# Patient Record
Sex: MALE | Race: WHITE | HISPANIC OR LATINO | Employment: FULL TIME | URBAN - METROPOLITAN AREA
[De-identification: names, ages, dates, MRNs, and addresses within clinical notes are randomized per-mention and may not be internally consistent; named-entity substitution may affect disease eponyms.]

---

## 2017-05-15 ENCOUNTER — TRANSCRIBE ORDERS (OUTPATIENT)
Dept: ADMINISTRATIVE | Facility: HOSPITAL | Age: 52
End: 2017-05-15

## 2017-05-15 DIAGNOSIS — E05.20 TOXIC MULTINODULAR GOITER: Primary | ICD-10-CM

## 2018-05-24 ENCOUNTER — HOSPITAL ENCOUNTER (EMERGENCY)
Facility: HOSPITAL | Age: 53
Discharge: HOME | End: 2018-05-24
Admitting: EMERGENCY MEDICINE
Payer: COMMERCIAL

## 2018-05-24 VITALS
SYSTOLIC BLOOD PRESSURE: 160 MMHG | RESPIRATION RATE: 15 BRPM | WEIGHT: 150 LBS | OXYGEN SATURATION: 96 % | DIASTOLIC BLOOD PRESSURE: 96 MMHG | HEART RATE: 56 BPM | TEMPERATURE: 98 F

## 2018-05-24 DIAGNOSIS — K64.9 HEMORRHOID: ICD-10-CM

## 2018-05-24 DIAGNOSIS — K62.5 RECTAL BLEEDING: Primary | ICD-10-CM

## 2018-05-24 LAB
ALBUMIN SERPL BCP-MCNC: 3.9 G/DL (ref 3.5–5)
ALP SERPL-CCNC: 85 U/L (ref 46–116)
ALT SERPL W P-5'-P-CCNC: 18 U/L (ref 12–78)
ANION GAP SERPL CALCULATED.3IONS-SCNC: 7 MMOL/L (ref 4–13)
AST SERPL W P-5'-P-CCNC: 24 U/L (ref 5–45)
BASOPHILS # BLD AUTO: 0.03 THOUSANDS/ΜL (ref 0–0.1)
BASOPHILS NFR BLD AUTO: 1 % (ref 0–1)
BILIRUB SERPL-MCNC: 0.68 MG/DL (ref 0.2–1)
BUN SERPL-MCNC: 8 MG/DL (ref 5–25)
CALCIUM SERPL-MCNC: 8.7 MG/DL (ref 8.3–10.1)
CHLORIDE SERPL-SCNC: 101 MMOL/L (ref 100–108)
CO2 SERPL-SCNC: 31 MMOL/L (ref 21–32)
CREAT SERPL-MCNC: 0.72 MG/DL (ref 0.6–1.3)
EOSINOPHIL # BLD AUTO: 0.27 THOUSAND/ΜL (ref 0–0.61)
EOSINOPHIL NFR BLD AUTO: 5 % (ref 0–6)
ERYTHROCYTE [DISTWIDTH] IN BLOOD BY AUTOMATED COUNT: 14.5 % (ref 11.6–15.1)
GFR SERPL CREATININE-BSD FRML MDRD: 108 ML/MIN/1.73SQ M
GLUCOSE SERPL-MCNC: 86 MG/DL (ref 65–140)
HCT VFR BLD AUTO: 44.3 % (ref 36.5–49.3)
HGB BLD-MCNC: 15 G/DL (ref 12–17)
LYMPHOCYTES # BLD AUTO: 1.99 THOUSANDS/ΜL (ref 0.6–4.47)
LYMPHOCYTES NFR BLD AUTO: 39 % (ref 14–44)
MCH RBC QN AUTO: 29.7 PG (ref 26.8–34.3)
MCHC RBC AUTO-ENTMCNC: 33.9 G/DL (ref 31.4–37.4)
MCV RBC AUTO: 88 FL (ref 82–98)
MONOCYTES # BLD AUTO: 0.33 THOUSAND/ΜL (ref 0.17–1.22)
MONOCYTES NFR BLD AUTO: 6 % (ref 4–12)
NEUTROPHILS # BLD AUTO: 2.53 THOUSANDS/ΜL (ref 1.85–7.62)
NEUTS SEG NFR BLD AUTO: 49 % (ref 43–75)
NRBC BLD AUTO-RTO: 0 /100 WBCS
PLATELET # BLD AUTO: 187 THOUSANDS/UL (ref 149–390)
PMV BLD AUTO: 9.9 FL (ref 8.9–12.7)
POTASSIUM SERPL-SCNC: 4.3 MMOL/L (ref 3.5–5.3)
PROT SERPL-MCNC: 7.6 G/DL (ref 6.4–8.2)
RBC # BLD AUTO: 5.05 MILLION/UL (ref 3.88–5.62)
SODIUM SERPL-SCNC: 139 MMOL/L (ref 136–145)
WBC # BLD AUTO: 5.15 THOUSAND/UL (ref 4.31–10.16)

## 2018-05-24 PROCEDURE — 36415 COLL VENOUS BLD VENIPUNCTURE: CPT | Performed by: PHYSICIAN ASSISTANT

## 2018-05-24 PROCEDURE — 99285 EMERGENCY DEPT VISIT HI MDM: CPT

## 2018-05-24 PROCEDURE — 80053 COMPREHEN METABOLIC PANEL: CPT | Performed by: PHYSICIAN ASSISTANT

## 2018-05-24 PROCEDURE — 85025 COMPLETE CBC W/AUTO DIFF WBC: CPT | Performed by: PHYSICIAN ASSISTANT

## 2018-05-24 NOTE — DISCHARGE INSTRUCTIONS
- Follow up with your family doctor for colonoscopy which is recommended at the age of 49  - Use sitz baths, Preparation H, tucks pads  - Increase fiber in your diet  Rectal Bleeding   WHAT YOU NEED TO KNOW:   Rectal bleeding can be caused by constipation, hemorrhoids, or anal fissures  It may also be caused by polyps, tumors, or medical conditions, such as colitis or diverticulitis  DISCHARGE INSTRUCTIONS:   Medicines:   · Pain medicine: You may be given medicine to take away or decrease pain  Do not wait until the pain is severe before you take your medicine  · Iron supplement:  Iron helps your body make more red blood cells  · Steroids: This medicine decreases inflammation in your rectum  It may be applied as a cream, ointment, or lotion  · Take your medicine as directed  Contact your healthcare provider if you think your medicine is not helping or if you have side effects  Tell him of her if you are allergic to any medicine  Keep a list of the medicines, vitamins, and herbs you take  Include the amounts, and when and why you take them  Bring the list or the pill bottles to follow-up visits  Carry your medicine list with you in case of an emergency  Follow up with your healthcare provider as directed:  Write down your questions so you remember to ask them during your visits  Drink liquids as directed:  Ask your healthcare provider how much liquid to drink each day and which liquids are best for you  This will help prevent dehydration and constipation  Contact your healthcare provider if:   · You have a fever  · Your rectal bleeding stopped for a time, but has started again  · You have nausea  · You have cold, sweaty, pale skin  · You have changes in your bowel movements, such as diarrhea  · You have questions or concerns about your condition or care    Return to the emergency department if:   · You are breathing faster than usual     · You are dizzy, lightheaded, or feel faint     · You are confused or cannot think clearly  · You urinate less than usual or not at all  · Your rectal bleeding is constant or heavy  · You have severe abdominal pain or cramping  © 2017 [address] [employer] Information is for End User's use only and may not be sold, redistributed or otherwise used for commercial purposes  All illustrations and images included in CareNotes® are the copyrighted property of A D A M , Inc  or Oswaldo Lozano  The above information is an  only  It is not intended as medical advice for individual conditions or treatments  Talk to your doctor, nurse or pharmacist before following any medical regimen to see if it is safe and effective for you

## 2018-05-24 NOTE — ED PROVIDER NOTES
History  Chief Complaint   Patient presents with    Rectal Bleeding     Patient reports hx of hemmorhoids  c/o increased amount of rectal bleeding from hemmorhoids  Reports bright red blood in toilet and stool after bowel movements  46 yo male with a history of htn, presents for rectal bleeding x 3 days  Reports hx of hemorrhoids and noted bright red blood in the toilet when he wiped  States he noted a lot in the toilet today  Denies fever, chills, dizziness, light-headedness, n/v, abdominal pain, rectal pain  Prior to Admission Medications   Prescriptions Last Dose Informant Patient Reported? Taking? methimazole (TAPAZOLE) 10 mg tablet   No Yes   Sig: Take 1 tablet (10 mg total) by mouth daily  propranolol (INDERAL) 20 mg tablet   No Yes   Sig: Take 1 tablet (20 mg total) by mouth 3 (three) times a day  Facility-Administered Medications: None       Past Medical History:   Diagnosis Date    Disease of thyroid gland     hyperthyroid    Hypertension        Past Surgical History:   Procedure Laterality Date    HERNIA REPAIR         History reviewed  No pertinent family history  I have reviewed and agree with the history as documented  Social History   Substance Use Topics    Smoking status: Never Smoker    Smokeless tobacco: Never Used    Alcohol use No        Review of Systems   Constitutional: Negative for chills and fever  Gastrointestinal: Positive for anal bleeding  Negative for abdominal pain, constipation, diarrhea, nausea, rectal pain and vomiting  Genitourinary: Negative for dysuria, flank pain, frequency, hematuria and urgency  Skin: Negative  Physical Exam  Physical Exam   Constitutional: He is oriented to person, place, and time  He appears well-developed and well-nourished  He is cooperative  No distress  HENT:   Head: Normocephalic and atraumatic  Neck: Normal range of motion  Neck supple  Cardiovascular: Normal rate and normal heart sounds      No murmur heard  Pulmonary/Chest: Effort normal and breath sounds normal    Abdominal: Soft  Bowel sounds are normal  There is no tenderness  Genitourinary: Rectal exam shows external hemorrhoid  Rectal exam shows no fissure, no tenderness and anal tone normal    Genitourinary Comments: Two external hemorrhoids noted  Non-thrombosed  No fresh blood noted  Hemoccult positive  Musculoskeletal: Normal range of motion  Neurological: He is alert and oriented to person, place, and time  Skin: Skin is warm  No rash noted  He is not diaphoretic  No erythema  Psychiatric: He has a normal mood and affect         Vital Signs  ED Triage Vitals [05/24/18 1200]   Temperature Pulse Respirations Blood Pressure SpO2   97 9 °F (36 6 °C) 63 18 (!) 171/99 95 %      Temp Source Heart Rate Source Patient Position - Orthostatic VS BP Location FiO2 (%)   Oral Monitor Sitting Right arm --      Pain Score       No Pain           Vitals:    05/24/18 1200 05/24/18 1305 05/24/18 1425   BP: (!) 171/99 165/97 160/96   Pulse: 63 65 56   Patient Position - Orthostatic VS: Sitting Lying Sitting       Visual Acuity      ED Medications  Medications - No data to display    Diagnostic Studies  Results Reviewed     Procedure Component Value Units Date/Time    Comprehensive metabolic panel [[id number]] Collected:  05/24/18 1334    Lab Status:  Final result Specimen:  Blood from Arm, Left Updated:  05/24/18 1355     Sodium 139 mmol/L      Potassium 4 3 mmol/L      Chloride 101 mmol/L      CO2 31 mmol/L      Anion Gap 7 mmol/L      BUN 8 mg/dL      Creatinine 0 72 mg/dL      Glucose 86 mg/dL      Calcium 8 7 mg/dL      AST 24 U/L      ALT 18 U/L      Alkaline Phosphatase 85 U/L      Total Protein 7 6 g/dL      Albumin 3 9 g/dL      Total Bilirubin 0 68 mg/dL      eGFR 108 ml/min/1 73sq m     Narrative:         National Kidney Disease Education Program recommendations are as follows:  GFR calculation is accurate only with a steady state creatinine  Chronic Kidney disease less than 60 ml/min/1 73 sq  meters  Kidney failure less than 15 ml/min/1 73 sq  meters  CBC and differential [[id number]] Collected:  05/24/18 1334    Lab Status:  Final result Specimen:  Blood from Arm, Left Updated:  05/24/18 1340     WBC 5 15 Thousand/uL      RBC 5 05 Million/uL      Hemoglobin 15 0 g/dL      Hematocrit 44 3 %      MCV 88 fL      MCH 29 7 pg      MCHC 33 9 g/dL      RDW 14 5 %      MPV 9 9 fL      Platelets 319 Thousands/uL      nRBC 0 /100 WBCs      Neutrophils Relative 49 %      Lymphocytes Relative 39 %      Monocytes Relative 6 %      Eosinophils Relative 5 %      Basophils Relative 1 %      Neutrophils Absolute 2 53 Thousands/µL      Lymphocytes Absolute 1 99 Thousands/µL      Monocytes Absolute 0 33 Thousand/µL      Eosinophils Absolute 0 27 Thousand/µL      Basophils Absolute 0 03 Thousands/µL                  No orders to display              Procedures  Procedures       Phone Contacts  ED Phone Contact    ED Course                               MDM  Number of Diagnoses or Management Options  Hemorrhoid:   Rectal bleeding:   Diagnosis management comments: Well appearing 48 yo male presents for evaluation of rectal bleeding x 3 days  In no acute distress  Advised to f/u with GI if symptoms persist  Advised supportive care for hemorrhoids  CritCare Time    Disposition  Final diagnoses:   Rectal bleeding   Hemorrhoid     Time reflects when diagnosis was documented in both MDM as applicable and the Disposition within this note     Time User Action Codes Description Comment    5/24/2018  2:12 PM Drew Vuong [K62 5] Rectal bleeding     5/24/2018  2:12 PM Drew Vuong [K64 9] Hemorrhoid       ED Disposition     ED Disposition Condition Comment    Discharge  [facility] discharge to home/self care      Condition at discharge: Good        Follow-up Information     Follow up With Specialties Details Why [address] [facility] Schedule an appointment as soon as possible for a visit Follow up if symptoms persist   [facility] [id number] [phone]    Yuliana Luke MD Family Medicine Schedule an appointment as soon as possible for a visit  [address] [facility] [address]  [address] [facility] [address]  [phone]            Discharge Medication List as of 5/24/2018  2:32 PM      CONTINUE these medications which have NOT CHANGED    Details   methimazole (TAPAZOLE) 10 mg tablet Take 1 tablet (10 mg total) by mouth daily  , Starting 4/24/2016, Until Discontinued, Print      propranolol (INDERAL) 20 mg tablet Take 1 tablet (20 mg total) by mouth 3 (three) times a day , Starting 4/24/2016, Until Discontinued, Print           No discharge procedures on file      ED Provider  Electronically Signed by           Kyler Munoz PA-C  05/28/18 7829

## 2019-09-22 ENCOUNTER — HOSPITAL ENCOUNTER (EMERGENCY)
Facility: HOSPITAL | Age: 54
Discharge: HOME | End: 2019-09-22
Attending: EMERGENCY MEDICINE | Admitting: EMERGENCY MEDICINE
Payer: COMMERCIAL

## 2019-09-22 ENCOUNTER — APPOINTMENT (EMERGENCY)
Dept: RADIOLOGY | Facility: HOSPITAL | Age: 54
End: 2019-09-22
Payer: COMMERCIAL

## 2019-09-22 VITALS
OXYGEN SATURATION: 97 % | WEIGHT: 155.42 LBS | HEART RATE: 70 BPM | TEMPERATURE: 97.9 F | DIASTOLIC BLOOD PRESSURE: 80 MMHG | SYSTOLIC BLOOD PRESSURE: 149 MMHG | RESPIRATION RATE: 16 BRPM

## 2019-09-22 DIAGNOSIS — R55 SYNCOPE: Primary | ICD-10-CM

## 2019-09-22 DIAGNOSIS — R07.9 CHEST PAIN: ICD-10-CM

## 2019-09-22 LAB
ANION GAP SERPL CALCULATED.3IONS-SCNC: 8 MMOL/L (ref 4–13)
BASOPHILS # BLD AUTO: 0.02 THOUSANDS/ΜL (ref 0–0.1)
BASOPHILS NFR BLD AUTO: 0 % (ref 0–1)
BUN SERPL-MCNC: 13 MG/DL (ref 5–25)
CALCIUM SERPL-MCNC: 8.7 MG/DL (ref 8.3–10.1)
CHLORIDE SERPL-SCNC: 105 MMOL/L (ref 100–108)
CO2 SERPL-SCNC: 28 MMOL/L (ref 21–32)
CREAT SERPL-MCNC: 0.87 MG/DL (ref 0.6–1.3)
EOSINOPHIL # BLD AUTO: 0.18 THOUSAND/ΜL (ref 0–0.61)
EOSINOPHIL NFR BLD AUTO: 4 % (ref 0–6)
ERYTHROCYTE [DISTWIDTH] IN BLOOD BY AUTOMATED COUNT: 13.3 % (ref 11.6–15.1)
GFR SERPL CREATININE-BSD FRML MDRD: 99 ML/MIN/1.73SQ M
GLUCOSE SERPL-MCNC: 196 MG/DL (ref 65–140)
HCT VFR BLD AUTO: 47.2 % (ref 36.5–49.3)
HGB BLD-MCNC: 15.2 G/DL (ref 12–17)
IMM GRANULOCYTES # BLD AUTO: 0.01 THOUSAND/UL (ref 0–0.2)
IMM GRANULOCYTES NFR BLD AUTO: 0 % (ref 0–2)
LYMPHOCYTES # BLD AUTO: 1.51 THOUSANDS/ΜL (ref 0.6–4.47)
LYMPHOCYTES NFR BLD AUTO: 31 % (ref 14–44)
MCH RBC QN AUTO: 28.3 PG (ref 26.8–34.3)
MCHC RBC AUTO-ENTMCNC: 32.2 G/DL (ref 31.4–37.4)
MCV RBC AUTO: 88 FL (ref 82–98)
MONOCYTES # BLD AUTO: 0.51 THOUSAND/ΜL (ref 0.17–1.22)
MONOCYTES NFR BLD AUTO: 10 % (ref 4–12)
NEUTROPHILS # BLD AUTO: 2.71 THOUSANDS/ΜL (ref 1.85–7.62)
NEUTS SEG NFR BLD AUTO: 55 % (ref 43–75)
NRBC BLD AUTO-RTO: 0 /100 WBCS
PLATELET # BLD AUTO: 199 THOUSANDS/UL (ref 149–390)
PMV BLD AUTO: 9.6 FL (ref 8.9–12.7)
POTASSIUM SERPL-SCNC: 3.8 MMOL/L (ref 3.5–5.3)
RBC # BLD AUTO: 5.37 MILLION/UL (ref 3.88–5.62)
SODIUM SERPL-SCNC: 141 MMOL/L (ref 136–145)
TROPONIN I SERPL-MCNC: <0.02 NG/ML
WBC # BLD AUTO: 4.94 THOUSAND/UL (ref 4.31–10.16)

## 2019-09-22 PROCEDURE — 80048 BASIC METABOLIC PNL TOTAL CA: CPT | Performed by: EMERGENCY MEDICINE

## 2019-09-22 PROCEDURE — 93005 ELECTROCARDIOGRAM TRACING: CPT

## 2019-09-22 PROCEDURE — 84484 ASSAY OF TROPONIN QUANT: CPT | Performed by: EMERGENCY MEDICINE

## 2019-09-22 PROCEDURE — 99284 EMERGENCY DEPT VISIT MOD MDM: CPT | Performed by: EMERGENCY MEDICINE

## 2019-09-22 PROCEDURE — 99284 EMERGENCY DEPT VISIT MOD MDM: CPT

## 2019-09-22 PROCEDURE — 71046 X-RAY EXAM CHEST 2 VIEWS: CPT

## 2019-09-22 PROCEDURE — 85025 COMPLETE CBC W/AUTO DIFF WBC: CPT | Performed by: EMERGENCY MEDICINE

## 2019-09-22 PROCEDURE — 36415 COLL VENOUS BLD VENIPUNCTURE: CPT | Performed by: EMERGENCY MEDICINE

## 2019-09-22 NOTE — ED PROVIDER NOTES
History  Chief Complaint   Patient presents with    Syncope     last night patient had episode of chest pain and dizziness  patient states passed out  patient has large abrasion on right side of face from impact  patient with currently no complaints     47 y o  M p/w syncope x yesterday  Pt was at home and felt lightheaded  States he had "just a little" pain in his left chest prior to passing out  Felt fine afterwards  States he feels fine currently  I asked pt why he didn't come in yesterday and he states "I don't know, I wasn't scared  I just want to know what happened yesterday, so that's why I came in "  Pt denies HA, changes in vision, SOB, cough, abd pain, N/V/D, focal deficits, recent travel  History provided by:  Patient  Syncope   Episode history:  Single  Most recent episode:  Yesterday  Chronicity:  New  Relieved by:  None tried  Worsened by:  Nothing  Ineffective treatments:  None tried  Associated symptoms: chest pain    Associated symptoms: no diaphoresis, no dizziness, no fever, no headaches, no nausea, no palpitations, no shortness of breath, no visual change, no vomiting and no weakness        Prior to Admission Medications   Prescriptions Last Dose Informant Patient Reported? Taking? methimazole (TAPAZOLE) 10 mg tablet   No Yes   Sig: Take 1 tablet (10 mg total) by mouth daily  propranolol (INDERAL) 20 mg tablet   No Yes   Sig: Take 1 tablet (20 mg total) by mouth 3 (three) times a day  Facility-Administered Medications: None       Past Medical History:   Diagnosis Date    Disease of thyroid gland     hyperthyroid    Environmental allergies     Hypertension        Past Surgical History:   Procedure Laterality Date    HERNIA REPAIR         Family History   Problem Relation Age of Onset    Parkinsonism Father      I have reviewed and agree with the history as documented      Social History     Tobacco Use    Smoking status: Never Smoker    Smokeless tobacco: Never Used Substance Use Topics    Alcohol use: No    Drug use: No        Review of Systems   Constitutional: Negative for chills, diaphoresis and fever  Respiratory: Negative for cough, chest tightness and shortness of breath  Cardiovascular: Positive for chest pain and syncope  Negative for palpitations and leg swelling  Gastrointestinal: Negative for abdominal pain, nausea and vomiting  Musculoskeletal: Negative for back pain and neck pain  Skin: Negative for pallor  Neurological: Positive for syncope  Negative for dizziness, facial asymmetry, speech difficulty, weakness, light-headedness, numbness and headaches  All other systems reviewed and are negative  Physical Exam  Physical Exam   Constitutional: He is oriented to person, place, and time  He appears well-developed and well-nourished  Non-toxic appearance  He does not have a sickly appearance  He does not appear ill  No distress  HENT:   Head: Normocephalic and atraumatic  Mouth/Throat: Oropharynx is clear and moist    Eyes: Pupils are equal, round, and reactive to light  EOM are normal    Neck: Normal range of motion  Neck supple  No JVD present  Cardiovascular: Normal rate, regular rhythm, S1 normal, S2 normal, normal heart sounds, intact distal pulses and normal pulses  Exam reveals no gallop and no friction rub  No murmur heard  Pulmonary/Chest: Effort normal and breath sounds normal  No respiratory distress  He has no wheezes  He has no rales  He exhibits no tenderness  Abdominal: Soft  Bowel sounds are normal  He exhibits no distension  There is no tenderness  There is no rebound and no guarding  Musculoskeletal:        Cervical back: Normal    No LE edema/tenderness   Neurological: He is alert and oriented to person, place, and time  He has normal strength  No cranial nerve deficit or sensory deficit  Skin: Skin is warm and dry  No rash noted  He is not diaphoretic  No pallor     Nursing note and vitals reviewed        Vital Signs  ED Triage Vitals [09/22/19 1542]   Temperature Pulse Respirations Blood Pressure SpO2   97 9 °F (36 6 °C) 70 16 149/80 97 %      Temp Source Heart Rate Source Patient Position - Orthostatic VS BP Location FiO2 (%)   Oral Monitor Lying Right arm --      Pain Score       No Pain           Vitals:    09/22/19 1542   BP: 149/80   Pulse: 70   Patient Position - Orthostatic VS: Lying         Visual Acuity  Visual Acuity      Most Recent Value   L Pupil Size (mm)  3   R Pupil Size (mm)  3          ED Medications  Medications - No data to display    Diagnostic Studies  Results Reviewed     Procedure Component Value Units Date/Time    Troponin I [[id number]]  (Normal) Collected:  09/22/19 1617    Lab Status:  Final result Specimen:  Blood from Arm, Right Updated:  09/22/19 1656     Troponin I <0 02 ng/mL     Basic metabolic panel [[id number]]  (Abnormal) Collected:  09/22/19 1617    Lab Status:  Final result Specimen:  Blood from Arm, Right Updated:  09/22/19 1655     Sodium 141 mmol/L      Potassium 3 8 mmol/L      Chloride 105 mmol/L      CO2 28 mmol/L      ANION GAP 8 mmol/L      BUN 13 mg/dL      Creatinine 0 87 mg/dL      Glucose 196 mg/dL      Calcium 8 7 mg/dL      eGFR 99 ml/min/1 73sq m     Narrative:       [employer] guidelines for Chronic Kidney Disease (CKD):     Stage 1 with normal or high GFR (GFR > 90 mL/min/1 73 square meters)    Stage 2 Mild CKD (GFR = 60-89 mL/min/1 73 square meters)    Stage 3A Moderate CKD (GFR = 45-59 mL/min/1 73 square meters)    Stage 3B Moderate CKD (GFR = 30-44 mL/min/1 73 square meters)    Stage 4 Severe CKD (GFR = 15-29 mL/min/1 73 square meters)    Stage 5 End Stage CKD (GFR <15 mL/min/1 73 square meters)  Note: GFR calculation is accurate only with a steady state creatinine    CBC and differential [[id number]] Collected:  09/22/19 1617    Lab Status:  Final result Specimen:  Blood from Arm, Right Updated:  09/22/19 1635 WBC 4 94 Thousand/uL      RBC 5 37 Million/uL      Hemoglobin 15 2 g/dL      Hematocrit 47 2 %      MCV 88 fL      MCH 28 3 pg      MCHC 32 2 g/dL      RDW 13 3 %      MPV 9 6 fL      Platelets 296 Thousands/uL      nRBC 0 /100 WBCs      Neutrophils Relative 55 %      Immat GRANS % 0 %      Lymphocytes Relative 31 %      Monocytes Relative 10 %      Eosinophils Relative 4 %      Basophils Relative 0 %      Neutrophils Absolute 2 71 Thousands/µL      Immature Grans Absolute 0 01 Thousand/uL      Lymphocytes Absolute 1 51 Thousands/µL      Monocytes Absolute 0 51 Thousand/µL      Eosinophils Absolute 0 18 Thousand/µL      Basophils Absolute 0 02 Thousands/µL                  XR chest 2 views   ED Interpretation by Estuardo Mccann DO (09/22 1636)   No acute abnormalities                 Procedures  ECG 12 Lead Documentation Only  Date/Time: 9/22/2019 4:03 PM  Performed by: Estuardo Mccann DO  Authorized by: Estuardo Mccann DO     Indications / Diagnosis:  Syncope  ECG reviewed by me, the ED Provider: yes    Patient location:  Bedside  Previous ECG:     Previous ECG:  Compared to current    Comparison ECG info:  4/24/2016  Rate:     ECG rate:  65    ECG rate assessment: normal    Rhythm:     Rhythm: sinus rhythm    Ectopy:     Ectopy: none    QRS:     QRS axis:  Normal    QRS intervals:  Normal  ST segments:     ST segments:  Normal           ED Course  ED Course as of Sep 22 1718   Sun Sep 22, 2019   1707 Updated pt on results and instructed him to f/u with PCP for elevated blood sugar  Pt agrees                                    MDM  Number of Diagnoses or Management Options     Amount and/or Complexity of Data Reviewed  Clinical lab tests: ordered and reviewed  Tests in the radiology section of CPT®: ordered and reviewed  Tests in the medicine section of CPT®: reviewed and ordered        Disposition  Final diagnoses:   Syncope   Chest pain     Time reflects when diagnosis was documented in both MDM as applicable and the Disposition within this note     Time User Action Codes Description Comment    9/22/2019  5:04 PM Ellyn Hussein 49 [R55] Syncope     9/22/2019  5:04 PM Ellyn Hussein 49 [R07 9] Chest pain       ED Disposition     ED Disposition Condition Date/Time Comment    Discharge Stable Sun Sep 22, 2019  5:05 PM Odalysanabell Mancillae discharge to home/self care  Follow-up Information     Follow up With Specialties Details Why Contact Info    Scotty Diamond MD Family Medicine Schedule an appointment as soon as possible for a visit  For your high blood sugar [address]  [address] [facility]  [address]             Discharge Medication List as of 9/22/2019  5:05 PM      CONTINUE these medications which have NOT CHANGED    Details   methimazole (TAPAZOLE) 10 mg tablet Take 1 tablet (10 mg total) by mouth daily  , Starting 4/24/2016, Until Discontinued, Print      propranolol (INDERAL) 20 mg tablet Take 1 tablet (20 mg total) by mouth 3 (three) times a day , Starting 4/24/2016, Until Discontinued, Print           No discharge procedures on file      ED Provider  Electronically Signed by           Christie Harper DO  09/22/19 0589

## 2019-09-23 LAB
ATRIAL RATE: 65 BPM
P AXIS: 53 DEGREES
PR INTERVAL: 154 MS
QRS AXIS: 34 DEGREES
QRSD INTERVAL: 88 MS
QT INTERVAL: 386 MS
QTC INTERVAL: 401 MS
T WAVE AXIS: 69 DEGREES
VENTRICULAR RATE: 65 BPM

## 2019-09-23 PROCEDURE — 93010 ELECTROCARDIOGRAM REPORT: CPT | Performed by: INTERNAL MEDICINE

## 2020-08-17 ENCOUNTER — OFFICE VISIT (OUTPATIENT)
Dept: FAMILY MEDICINE CLINIC | Facility: CLINIC | Age: 55
End: 2020-08-17

## 2020-08-17 VITALS
HEART RATE: 66 BPM | DIASTOLIC BLOOD PRESSURE: 80 MMHG | OXYGEN SATURATION: 98 % | TEMPERATURE: 98 F | HEIGHT: 66 IN | BODY MASS INDEX: 22.98 KG/M2 | SYSTOLIC BLOOD PRESSURE: 130 MMHG | RESPIRATION RATE: 16 BRPM | WEIGHT: 143 LBS

## 2020-08-17 DIAGNOSIS — E05.00 GRAVES DISEASE: ICD-10-CM

## 2020-08-17 DIAGNOSIS — E05.00 GRAVES DISEASE: Primary | ICD-10-CM

## 2020-08-17 DIAGNOSIS — E55.9 VITAMIN D DEFICIENCY: ICD-10-CM

## 2020-08-17 DIAGNOSIS — I10 ESSENTIAL HYPERTENSION: ICD-10-CM

## 2020-08-17 PROBLEM — E89.0 POSTABLATIVE HYPOTHYROIDISM: Status: ACTIVE | Noted: 2019-05-20

## 2020-08-17 PROBLEM — E05.90 SUBCLINICAL HYPERTHYROIDISM: Status: ACTIVE | Noted: 2020-08-17

## 2020-08-17 PROCEDURE — 3075F SYST BP GE 130 - 139MM HG: CPT | Performed by: FAMILY MEDICINE

## 2020-08-17 PROCEDURE — 3008F BODY MASS INDEX DOCD: CPT | Performed by: FAMILY MEDICINE

## 2020-08-17 PROCEDURE — 99203 OFFICE O/P NEW LOW 30 MIN: CPT | Performed by: FAMILY MEDICINE

## 2020-08-17 PROCEDURE — 1036F TOBACCO NON-USER: CPT | Performed by: FAMILY MEDICINE

## 2020-08-17 PROCEDURE — 3725F SCREEN DEPRESSION PERFORMED: CPT | Performed by: FAMILY MEDICINE

## 2020-08-17 PROCEDURE — 3079F DIAST BP 80-89 MM HG: CPT | Performed by: FAMILY MEDICINE

## 2020-08-17 RX ORDER — LEVOTHYROXINE SODIUM 0.03 MG/1
25 TABLET ORAL DAILY
COMMUNITY
Start: 2019-11-22 | End: 2020-08-17 | Stop reason: ALTCHOICE

## 2020-08-17 RX ORDER — PROPRANOLOL HYDROCHLORIDE 10 MG/1
10 TABLET ORAL 3 TIMES DAILY
Qty: 90 TABLET | Refills: 0 | Status: SHIPPED | OUTPATIENT
Start: 2020-08-17 | End: 2020-08-18

## 2020-08-17 NOTE — PROGRESS NOTES
Assessment/Plan:    Graves disease  TSH persistently low w/ normal T4  S/p ablation however concerned that there is residual functioning thyroid tissue  Have advised stopping Synthroid at this time  F/u NM uptake scan to further assess  Repeat TSH w/ T4 in 4 weeks  Vitamin D deficiency  F/u on Vit D 25 hydroxy    Essential hypertension  Currently at goal of < 140/90  Cont w/ Propranolol at this time, but will decrease to 10 mg TID  Plan to transition to different BP medication once thyroid function has normalized  Diagnoses and all orders for this visit:    Graves disease  -     propranolol (INDERAL) 10 mg tablet; Take 1 tablet (10 mg total) by mouth 3 (three) times a day  -     NM thyroid imaging w uptake(s); Future  -     TSH + Free T4; Future    Essential hypertension  -     propranolol (INDERAL) 10 mg tablet; Take 1 tablet (10 mg total) by mouth 3 (three) times a day    Vitamin D deficiency  -     Vitamin D 25 hydroxy; Future    Other orders  -     Discontinue: levothyroxine 25 mcg tablet; Take 25 mcg by mouth daily  -     Cancel: Thyroid Antibodies Panel; Future          Subjective:      Patient ID: Lupis Quezada is a 49 y o  male  Lupis Quezada is a 49 y o  male, presents to establish care w/ clinic  He was previously seen at [facility] but decided to transition care after his prior PCP relocated  Today his main concern is his thyroid function; His PMH significant for Graves Ds, s/p ablation in 2017  He has been taking Levothyroxine and has been managed by Endocrinology  Per chart review there has been some issues w/ medication compliance but currently takes his Levothyroxine in the morning on an empty stomach  The following portions of the patient's history were reviewed and updated as appropriate: He  has a past medical history of Disease of thyroid gland, Environmental allergies, and Hypertension    Patient Active Problem List    Diagnosis Date Noted    Graves disease 08/17/2020    Essential hypertension 08/17/2020    Vitamin D deficiency 08/17/2020    Postablative hypothyroidism 05/20/2019     He  has a past surgical history that includes Hernia repair  His family history includes Parkinsonism in his father  He  reports that he has never smoked  He has never used smokeless tobacco  He reports that he does not drink alcohol or use drugs  Current Outpatient Medications   Medication Sig Dispense Refill    propranolol (INDERAL) 10 mg tablet Take 1 tablet (10 mg total) by mouth 3 (three) times a day 90 tablet 0     No current facility-administered medications for this visit  Current Outpatient Medications on File Prior to Visit   Medication Sig    [DISCONTINUED] methimazole (TAPAZOLE) 10 mg tablet Take 1 tablet (10 mg total) by mouth daily   [DISCONTINUED] propranolol (INDERAL) 20 mg tablet Take 1 tablet (20 mg total) by mouth 3 (three) times a day   [DISCONTINUED] levothyroxine 25 mcg tablet Take 25 mcg by mouth daily     No current facility-administered medications on file prior to visit  He has No Known Allergies       Review of Systems   Constitutional: Negative for appetite change, diaphoresis and fever  Respiratory: Negative for cough, shortness of breath and wheezing  Cardiovascular: Negative for chest pain and palpitations  Gastrointestinal: Negative for abdominal pain, constipation and diarrhea  Neurological: Negative for dizziness and headaches  Psychiatric/Behavioral: The patient is not nervous/anxious  Objective:      /80 (BP Location: Right arm, Patient Position: Sitting, Cuff Size: Standard)   Pulse 66   Temp 98 °F (36 7 °C)   Resp 16   Ht 5' 6 25" (1 683 m)   Wt 64 9 kg (143 lb)   SpO2 98%   BMI 22 91 kg/m²          Physical Exam  Vitals signs reviewed  Constitutional:       General: He is not in acute distress  Appearance: He is well-developed  He is not diaphoretic     HENT:      Nose: Nose normal    Eyes: Conjunctiva/sclera: Conjunctivae normal    Neck:      Musculoskeletal: Normal range of motion and neck supple  No muscular tenderness  Thyroid: No thyroid mass, thyromegaly or thyroid tenderness  Cardiovascular:      Rate and Rhythm: Normal rate and regular rhythm  Heart sounds: Normal heart sounds  Pulmonary:      Effort: Pulmonary effort is normal  No respiratory distress  Breath sounds: Normal breath sounds  No wheezing  Musculoskeletal:         General: No tenderness  Skin:     General: Skin is warm and dry  Neurological:      Mental Status: He is alert

## 2020-08-17 NOTE — ASSESSMENT & PLAN NOTE
TSH persistently low w/ normal T4  S/p ablation however concerned that there is residual functioning thyroid tissue  Have advised stopping Synthroid at this time  F/u NM uptake scan to further assess  Repeat TSH w/ T4 in 4 weeks

## 2020-08-17 NOTE — ASSESSMENT & PLAN NOTE
Currently at goal of < 140/90  Cont w/ Propranolol at this time, but will decrease to 10 mg TID  Plan to transition to different BP medication once thyroid function has normalized

## 2020-08-18 RX ORDER — PROPRANOLOL HYDROCHLORIDE 10 MG/1
TABLET ORAL
Qty: 270 TABLET | Refills: 0 | Status: SHIPPED | OUTPATIENT
Start: 2020-08-18 | End: 2020-11-02 | Stop reason: SDUPTHER

## 2020-11-02 ENCOUNTER — TELEPHONE (OUTPATIENT)
Dept: FAMILY MEDICINE CLINIC | Facility: CLINIC | Age: 55
End: 2020-11-02

## 2020-11-02 DIAGNOSIS — I10 ESSENTIAL HYPERTENSION: ICD-10-CM

## 2020-11-02 DIAGNOSIS — E05.00 GRAVES DISEASE: ICD-10-CM

## 2020-11-02 RX ORDER — PROPRANOLOL HYDROCHLORIDE 10 MG/1
10 TABLET ORAL 3 TIMES DAILY
Qty: 270 TABLET | Refills: 0 | Status: SHIPPED | OUTPATIENT
Start: 2020-11-02

## 2020-11-12 ENCOUNTER — TELEMEDICINE (OUTPATIENT)
Dept: FAMILY MEDICINE CLINIC | Facility: CLINIC | Age: 55
End: 2020-11-12

## 2020-11-12 DIAGNOSIS — E05.00 GRAVES DISEASE: Primary | ICD-10-CM

## 2020-11-12 PROCEDURE — G2012 BRIEF CHECK IN BY MD/QHP: HCPCS | Performed by: FAMILY MEDICINE

## 2021-01-19 RX ORDER — LEVOTHYROXINE SODIUM 0.03 MG/1
12.5 TABLET ORAL DAILY
COMMUNITY
Start: 2020-09-22 | End: 2021-09-22

## 2021-01-20 ENCOUNTER — OFFICE VISIT (OUTPATIENT)
Dept: FAMILY MEDICINE CLINIC | Facility: CLINIC | Age: 56
End: 2021-01-20

## 2021-01-20 VITALS
TEMPERATURE: 98.9 F | RESPIRATION RATE: 16 BRPM | OXYGEN SATURATION: 98 % | WEIGHT: 152 LBS | HEIGHT: 66 IN | HEART RATE: 78 BPM | BODY MASS INDEX: 24.43 KG/M2 | SYSTOLIC BLOOD PRESSURE: 140 MMHG | DIASTOLIC BLOOD PRESSURE: 90 MMHG

## 2021-01-20 DIAGNOSIS — K64.9 HEMORRHOIDS, UNSPECIFIED HEMORRHOID TYPE: Primary | ICD-10-CM

## 2021-01-20 PROCEDURE — 3077F SYST BP >= 140 MM HG: CPT | Performed by: NURSE PRACTITIONER

## 2021-01-20 PROCEDURE — 3008F BODY MASS INDEX DOCD: CPT | Performed by: NURSE PRACTITIONER

## 2021-01-20 PROCEDURE — 99213 OFFICE O/P EST LOW 20 MIN: CPT | Performed by: NURSE PRACTITIONER

## 2021-01-20 PROCEDURE — 1036F TOBACCO NON-USER: CPT | Performed by: NURSE PRACTITIONER

## 2021-01-20 PROCEDURE — 3080F DIAST BP >= 90 MM HG: CPT | Performed by: NURSE PRACTITIONER

## 2021-01-20 RX ORDER — LIDOCAINE 50 MG/G
OINTMENT TOPICAL AS NEEDED
Qty: 35.44 G | Refills: 0 | Status: SHIPPED | OUTPATIENT
Start: 2021-01-20

## 2021-01-20 RX ORDER — DOCUSATE SODIUM 100 MG/1
100 CAPSULE, LIQUID FILLED ORAL 2 TIMES DAILY
Qty: 10 CAPSULE | Refills: 0 | Status: SHIPPED | OUTPATIENT
Start: 2021-01-20 | End: 2021-02-25 | Stop reason: SDUPTHER

## 2021-01-20 NOTE — PROGRESS NOTES
Assessment/Plan:    Problem List Items Addressed This Visit        Digestive    Hemorrhoids - Primary     External hemorrhoids observed on exam   Causes pain for patient  He desires complete removal   Until then, will cover with topical medication and stool softener  Relevant Medications    docusate sodium (COLACE) 100 mg capsule    lidocaine (XYLOCAINE) 5 % ointment    phenylephrine-shark liver oil-mineral oil-petrolatum (PREPARATION H) 0 25-3-14-71 9 % rectal ointment    Other Relevant Orders    Ambulatory referral to Colorectal Surgery            Return in about 1 month (around 2/20/2021) for Annual physical w/PCP  I have spent 20 minutes with Patient  today in which greater than 50% of this time was spent in counseling/coordination of care regarding Diagnostic results, Prognosis, Risks and benefits of tx options, Intructions for management, Patient and family education, Importance of tx compliance, Risk factor reductions and Impressions  Subjective:     HPI: Bhaskar Deng is a 48 y o  male who  has a past medical history of Disease of thyroid gland, Environmental allergies, and Hypertension  who presented to the office today for   An evaluation of his concern for hemorrhoids  He experiences rectal pain when sitting for too long or having a bowel movement  This has been going on for about 2 years and has not sought treatment up to this point  He desires definitive treatment with removal but willing to try topical pharmacotherapy until then  He has no other complaints at this time      The following portions of the patient's history were reviewed and updated as appropriate: allergies, current medications, past family history, past medical history, past social history, past surgical history and problem list     Current Outpatient Medications on File Prior to Visit   Medication Sig Dispense Refill    propranolol (INDERAL) 10 mg tablet Take 1 tablet (10 mg total) by mouth 3 (three) times a day 270 tablet 0    levothyroxine 25 mcg tablet Take 12 5 mcg by mouth daily       No current facility-administered medications on file prior to visit  Review of Systems   Constitutional: Negative for chills and fever  HENT: Negative for ear pain and sore throat  Eyes: Negative for pain and visual disturbance  Respiratory: Negative for cough and shortness of breath  Cardiovascular: Negative for chest pain and palpitations  Gastrointestinal: Positive for rectal pain  Negative for abdominal pain and vomiting  Genitourinary: Negative for dysuria and hematuria  Musculoskeletal: Negative for arthralgias and back pain  Skin: Negative for color change and rash  Neurological: Negative for seizures and syncope  Psychiatric/Behavioral: Negative  All other systems reviewed and are negative  Objective:    /90 (BP Location: Left arm, Patient Position: Sitting, Cuff Size: Standard)   Pulse 78   Temp 98 9 °F (37 2 °C) (Temporal)   Resp 16   Ht 5' 6 25" (1 683 m)   Wt 68 9 kg (152 lb)   SpO2 98%   BMI 24 35 kg/m²     Physical Exam  Constitutional:       Appearance: Normal appearance  He is normal weight  HENT:      Head: Normocephalic  Right Ear: External ear normal       Left Ear: External ear normal       Nose: Nose normal       Mouth/Throat:      Mouth: Mucous membranes are moist    Eyes:      Extraocular Movements: Extraocular movements intact  Pupils: Pupils are equal, round, and reactive to light  Neck:      Musculoskeletal: Normal range of motion  Cardiovascular:      Rate and Rhythm: Normal rate and regular rhythm  Pulses: Normal pulses  Heart sounds: Normal heart sounds  Pulmonary:      Effort: Pulmonary effort is normal       Breath sounds: Normal breath sounds  Abdominal:      General: Bowel sounds are normal       Palpations: Abdomen is soft  Genitourinary:     Rectum: External hemorrhoid present         Musculoskeletal: Normal range of motion  General: No tenderness or signs of injury  Right lower leg: No edema  Left lower leg: No edema  Skin:     General: Skin is warm and dry  Capillary Refill: Capillary refill takes less than 2 seconds  Findings: No bruising, lesion or rash  Neurological:      General: No focal deficit present  Mental Status: He is alert and oriented to person, place, and time  Psychiatric:         Mood and Affect: Mood normal          Behavior: Behavior normal          Thought Content: Thought content normal          Rishi ROMULO Guthrie  01/20/21  8:24 AM    Patient Instructions     Hemorrhoids   WHAT YOU NEED TO KNOW:   Hemorrhoids are swollen blood vessels inside your rectum (internal hemorrhoids) or on your anus (external hemorrhoids)  Sometimes a hemorrhoid may prolapse  This means it extends out of your anus  DISCHARGE INSTRUCTIONS:   Return to the emergency department if:   · You have severe pain in your rectum or around your anus  · You have severe pain in your abdomen and you are vomiting  · You have bleeding from your anus that soaks through your underwear  Contact your healthcare provider if:   · You have frequent and painful bowel movements  · Your hemorrhoid looks or feels more swollen than usual      · You do not have a bowel movement for 2 days or more  · You see or feel tissue coming through your anus  · You have questions or concerns about your condition or care  Medicines: You may  need any of the following:  · A pad, cream, or ointment  can help decrease pain, swelling, and itching  · Stool softeners  help treat or prevent constipation  · NSAIDs , such as ibuprofen, help decrease swelling, pain, and fever  NSAIDs can cause stomach bleeding or kidney problems in certain people  If you take blood thinner medicine, always ask your healthcare provider if NSAIDs are safe for you  Always read the medicine label and follow directions      · Take your medicine as directed  Contact your healthcare provider if you think your medicine is not helping or if you have side effects  Tell him or her if you are allergic to any medicine  Keep a list of the medicines, vitamins, and herbs you take  Include the amounts, and when and why you take them  Bring the list or the pill bottles to follow-up visits  Carry your medicine list with you in case of an emergency  Manage your symptoms:   · Apply ice on your anus for 15 to 20 minutes every hour or as directed  Use an ice pack, or put crushed ice in a plastic bag  Cover it with a towel before you apply it to your anus  Ice helps prevent tissue damage and decreases swelling and pain  · Take a sitz bath  Fill a bathtub with 4 to 6 inches of warm water  You may also use a sitz bath pan that fits inside a toilet bowl  Sit in the sitz bath for 15 minutes  Do this 3 times a day, and after each bowel movement  The warm water can help decrease pain and swelling  · Keep your anal area clean  Gently wash the area with warm water daily  Soap may irritate the area  After a bowel movement, wipe with moist towelettes or wet toilet paper  Dry toilet paper can irritate the area  Prevent hemorrhoids:   · Do not strain to have a bowel movement  Do not sit on the toilet too long  These actions can increase pressure on the tissues in your rectum and anus  · Drink plenty of liquids  Liquids can help prevent constipation  Ask how much liquid to drink each day and which liquids are best for you  · Eat a variety of high-fiber foods  Examples include fruits, vegetables, and whole grains  Ask your healthcare provider how much fiber you need each day  You may need to take a fiber supplement  · Exercise as directed  Exercise, such as walking, may make it easier to have a bowel movement  Ask your healthcare provider to help you create an exercise plan  · Do not have anal sex    Anal sex can weaken the skin around your rectum and anus  · Avoid heavy lifting  This can cause straining and increase your risk for another hemorrhoid  Follow up with your healthcare provider as directed:  Write down your questions so you remember to ask them during your visits  © Copyright [address] Information is for End User's use only and may not be sold, redistributed or otherwise used for commercial purposes  All illustrations and images included in CareNotes® are the copyrighted property of A D A M , Inc  or [facility] [place]   The above information is an  only  It is not intended as medical advice for individual conditions or treatments  Talk to your doctor, nurse or pharmacist before following any medical regimen to see if it is safe and effective for you

## 2021-01-20 NOTE — ASSESSMENT & PLAN NOTE
External hemorrhoids observed on exam   Causes pain for patient  He desires complete removal   Until then, will cover with topical medication and stool softener     -Discussed lifestyle factors to alleviate symptoms: weight loss, high-fiber diet, warm sitz bath  Handout provided for pt information

## 2021-01-20 NOTE — PATIENT INSTRUCTIONS
Hemorrhoids   WHAT YOU NEED TO KNOW:   Hemorrhoids are swollen blood vessels inside your rectum (internal hemorrhoids) or on your anus (external hemorrhoids)  Sometimes a hemorrhoid may prolapse  This means it extends out of your anus  DISCHARGE INSTRUCTIONS:   Return to the emergency department if:   · You have severe pain in your rectum or around your anus  · You have severe pain in your abdomen and you are vomiting  · You have bleeding from your anus that soaks through your underwear  Contact your healthcare provider if:   · You have frequent and painful bowel movements  · Your hemorrhoid looks or feels more swollen than usual      · You do not have a bowel movement for 2 days or more  · You see or feel tissue coming through your anus  · You have questions or concerns about your condition or care  Medicines: You may  need any of the following:  · A pad, cream, or ointment  can help decrease pain, swelling, and itching  · Stool softeners  help treat or prevent constipation  · NSAIDs , such as ibuprofen, help decrease swelling, pain, and fever  NSAIDs can cause stomach bleeding or kidney problems in certain people  If you take blood thinner medicine, always ask your healthcare provider if NSAIDs are safe for you  Always read the medicine label and follow directions  · Take your medicine as directed  Contact your healthcare provider if you think your medicine is not helping or if you have side effects  Tell him or her if you are allergic to any medicine  Keep a list of the medicines, vitamins, and herbs you take  Include the amounts, and when and why you take them  Bring the list or the pill bottles to follow-up visits  Carry your medicine list with you in case of an emergency  Manage your symptoms:   · Apply ice on your anus for 15 to 20 minutes every hour or as directed  Use an ice pack, or put crushed ice in a plastic bag   Cover it with a towel before you apply it to your anus  Ice helps prevent tissue damage and decreases swelling and pain  · Take a sitz bath  Fill a bathtub with 4 to 6 inches of warm water  You may also use a sitz bath pan that fits inside a toilet bowl  Sit in the sitz bath for 15 minutes  Do this 3 times a day, and after each bowel movement  The warm water can help decrease pain and swelling  · Keep your anal area clean  Gently wash the area with warm water daily  Soap may irritate the area  After a bowel movement, wipe with moist towelettes or wet toilet paper  Dry toilet paper can irritate the area  Prevent hemorrhoids:   · Do not strain to have a bowel movement  Do not sit on the toilet too long  These actions can increase pressure on the tissues in your rectum and anus  · Drink plenty of liquids  Liquids can help prevent constipation  Ask how much liquid to drink each day and which liquids are best for you  · Eat a variety of high-fiber foods  Examples include fruits, vegetables, and whole grains  Ask your healthcare provider how much fiber you need each day  You may need to take a fiber supplement  · Exercise as directed  Exercise, such as walking, may make it easier to have a bowel movement  Ask your healthcare provider to help you create an exercise plan  · Do not have anal sex  Anal sex can weaken the skin around your rectum and anus  · Avoid heavy lifting  This can cause straining and increase your risk for another hemorrhoid  Follow up with your healthcare provider as directed:  Write down your questions so you remember to ask them during your visits  © Copyright [place] Information is for End User's use only and may not be sold, redistributed or otherwise used for commercial purposes  All illustrations and images included in CareNotes® are the copyrighted property of A D A M , Inc  or [address] [place], [place] [address]beti   The above information is an  only   It is not intended as medical advice for individual conditions or treatments  Talk to your doctor, nurse or pharmacist before following any medical regimen to see if it is safe and effective for you

## 2021-02-25 ENCOUNTER — OFFICE VISIT (OUTPATIENT)
Dept: FAMILY MEDICINE CLINIC | Facility: CLINIC | Age: 56
End: 2021-02-25

## 2021-02-25 VITALS
RESPIRATION RATE: 18 BRPM | HEART RATE: 71 BPM | HEIGHT: 66 IN | TEMPERATURE: 98.5 F | BODY MASS INDEX: 25.04 KG/M2 | WEIGHT: 155.8 LBS | OXYGEN SATURATION: 98 % | SYSTOLIC BLOOD PRESSURE: 144 MMHG | DIASTOLIC BLOOD PRESSURE: 82 MMHG

## 2021-02-25 DIAGNOSIS — K64.9 HEMORRHOIDS, UNSPECIFIED HEMORRHOID TYPE: ICD-10-CM

## 2021-02-25 DIAGNOSIS — Z12.11 ENCOUNTER FOR SCREENING COLONOSCOPY: ICD-10-CM

## 2021-02-25 DIAGNOSIS — Z23 ENCOUNTER FOR IMMUNIZATION: Primary | ICD-10-CM

## 2021-02-25 PROCEDURE — 99396 PREV VISIT EST AGE 40-64: CPT | Performed by: FAMILY MEDICINE

## 2021-02-25 PROCEDURE — 90472 IMMUNIZATION ADMIN EACH ADD: CPT | Performed by: FAMILY MEDICINE

## 2021-02-25 PROCEDURE — 90715 TDAP VACCINE 7 YRS/> IM: CPT | Performed by: FAMILY MEDICINE

## 2021-02-25 PROCEDURE — 90471 IMMUNIZATION ADMIN: CPT | Performed by: FAMILY MEDICINE

## 2021-02-25 PROCEDURE — 90682 RIV4 VACC RECOMBINANT DNA IM: CPT | Performed by: FAMILY MEDICINE

## 2021-02-25 RX ORDER — DOCUSATE SODIUM 100 MG/1
100 CAPSULE, LIQUID FILLED ORAL DAILY
Qty: 30 CAPSULE | Refills: 0 | Status: SHIPPED | OUTPATIENT
Start: 2021-02-25

## 2021-02-25 NOTE — PATIENT INSTRUCTIONS
Las hemorroides   LO QUE NECESITA SABER:   Las hemorroides son Rolene Revel sanguíneos inflamados dentro del recto (hemorroides internas) o en el ano (hemorroides externas)  A veces, sita hemorroide puede hacer un prolapso  [place] significa que se extiende fuera del ano  INSTRUCCIONES SOBRE EL [facility]:   Regrese a la bobby de emergencias si:  · Usted siente mucho dolor en el recto o alrededor del ano  · Tiene dolor intenso en el abdomen y está vomitando  · Tiene sangrado por el ano que le empapa la ropa interior  Comuníquese con dey médico si:  · Usted tiene deposiciones intestinales frecuentes y dolorosas  · La hemorroide se ve o se siente más hinchada que de costumbre  · Usted no tiene evacuaciones intestinales por 2 días o más  · Ve o siente que Maria Guadalupe Tire un tejido del ano  · Usted tiene preguntas o inquietudes acerca de dey condición o cuidado  Medicamentos: Es posible que usted necesite alguno de los siguientes:  · Artist Antes, sita crema o un ungüento pueden ayudar a disminuir el dolor, la hinchazón y la picazón  · Los laxantes ayudan a tratar o a evitar el estreñimiento  · [place], rick el ibuprofeno, [facility] a disminuir la inflamación, el dolor y la [place]  Los TILA pueden causar sangrado estomacal o problemas renales en ciertas personas  Si usted lulu un medicamento anticoagulante, siempre pregúntele a dey médico si los TILA son seguros para usted  Siempre anastasia la etiqueta de demarco medicamento y [place] instrucciones  · [place] akhil medicamentos rick se le haya indicado  Consulte con dey médico si usted tressa que dey medicamento no le está ayudando o si presenta efectos secundarios  Infórmele si es alérgico a cualquier medicamento  Mantenga sita lista actualizada de los [place], las vitaminas y los productos herbales que lulu  Incluya los siguientes datos de los medicamentos: cantidad, frecuencia y motivo de administración   Traiga con usted la lista o los envases de las píldoras a akhil citas de seguimiento  Lleve la lista de los medicamentos con usted en daniela de sita emergencia  El manejo de akhil síntomas:  · Aplíquese hielo en el ano de 15 a 20 minutos [address] indicaciones  Use sita compresa de hielo o ponga hielo triturado en sita bolsa de plástico  Dyane Bard con sita toalla antes de aplicar sobre el ano  El hielo ayuda a evitar daño al tejido y a disminuir la inflamación y el dolor  · [place] un baño de asiento  Llene sita traci de baño con 4 a 6 pulgadas de [place]  [address] usar un recipiente para baño de asiento que quepa en el inodoro  Siéntese en el baño de asiento david 15 minutos  Hágalo 3 veces al día y después de cada evacuación intestinal  El agua cálida va a ayudar a reducir el dolor y la inflamación  · Mantenga limpia el área del ano  Lávese el área con agua tibia todos los días  El jabón podría causar irritación  Límpiese con [employer] o papel higiénico húmedo después de tener sita deposición intestinal  El papel higiénico seco podría irritar el área  Evite las hemorroides:  · No se force para tener un movimiento intestinal  No se siente en el inodoro david mucho tiempo  Estas acciones pueden aumentar la presión [employer] tejidos del recto y el ano  · [place] suficiente líquidos  Los líquidos pueden ayudar a prevenir el estreñimiento  Pregunte cuánto líquido debe ricardo cada día y cuáles líquidos son los más adecuados para usted  · Consuma sita variedad de alimentos ricos en fibra  [address] [facility], se incluyen frutas, vegetales y granos enteros  Pregunte a dey médico cuál es la cantidad de fibra que necesita al día  Es posible que deba ricardo un suplemento de [place]  · [place] según indicaciones  El hacer ejercicio, rick caminar, puede servir para que tenga sita evacuación intestinal  Pida a dey médico que lo ayude a crear un programa de ejercicio      · No tenga contacto sexual anal  El contacto sexual anal podría debilitar la piel alrededor del recto y el ano  · Evite levantar cosas pesadas  [place] puede causar esfuerzo y aumentar el riesgo de tener otra hemorroide  Acuda a akhil consultas de control con dey médico según le indicaron  Anote akhil preguntas para que se acuerde de hacerlas david akhil visitas  © Copyright [address] Information is for End User's use only and may not be sold, redistributed or otherwise used for commercial purposes  All illustrations and images included in CareNotes® are the copyrighted property of A D A M [employer]  or [address] [place] [address] es sólo para uso en educación  Dey intención no es darle un consejo médico sobre enfermedades o tratamientos  Colsulte con dey Anitha Clipper farmacéutico antes de seguir cualquier régimen médico para saber si es seguro y efectivo para usted

## 2021-02-25 NOTE — PROGRESS NOTES
[address] [place] [address]  Annual Well Adult Visit  Assessment/Plan     1  Encounter for immunization  - influenza vaccine, quadrivalent, recombinant, PF, 0 5 mL, for patients 18 yr+ (FLUBLOK)      1  Healthy male exam   2  Patient Counseling:   · Nutrition: Stressed importance of a well balanced diet, moderation of sodium/saturated fat, caloric balance and sufficient intake of fiber  · Exercise: Stressed the importance of regular exercise with a goal of 150 minutes per week  · Dental Health: Discussed daily flossing and brushing and regular dental visits     · Immunizations reviewed  · Discussed benefits of screening Colonoscopy, PSA ( decided to forgo PSA, as pt is of low risk)  · Discussed the patient's BMI with him  The BMI is in the acceptable range  3  Cancer Screening Colonscopy  4  Labs Up to date  5  Follow up as needed for acute illness  Homero Yarbrough is a 48 y o   male and is here for routine health maintenance  The patient reports problems - please see HPI  History of Present Illness     Toya Peabody is a 48 y o  male, past medical history significant for graves disease status post total thyroid ablation, iatrogenic hypothyroidism, presented to the clinic for annual physical exam   Today his main concern are chronic hemorrhoids; he reports intermittent bleeding, pain, and "inflammation"  He has tried over-the-counter creams which has not helped and he is wanting  To be evaluated  He denies any history of chronic constipation but does on occasion have straining with his bowel movements    He has never been for a screening colonoscopy nor has seen a GI specialist         Well Adult Physical   Patient here for a comprehensive physical exam       Diet and Physical Activity  Diet: well balanced diet  Weight concerns: None, patient's BMI is between 18 5-24 9  Exercise: never      Depression Screen  PHQ-9 Depression Screening    PHQ-9:   Frequency of the following problems over the past two weeks:              General Health  Hearing: Normal:  bilateral  Vision: no vision problems  Dental: regular dental visits      Cancer Screening  Colononoscopy Never, no hx of colon cancer in this pt's family  PSA Never    Smoker Never  Annual screening with low-dose helical computed tomography (CT) for patients age 54 to 76 years with history of smoking at least 30 pack-years and, if a former smoker, had quit within the previous 15 years      The following portions of the patient's history were reviewed and updated as appropriate: allergies, current medications, past family history, past medical history, past social history, past surgical history and problem list     Review of Systems     Review of Systems   Constitutional: Negative for appetite change, diaphoresis and fever  Respiratory: Negative for cough, shortness of breath and wheezing  Cardiovascular: Negative for chest pain and palpitations  Gastrointestinal: Positive for anal bleeding and rectal pain  Negative for abdominal pain, constipation and diarrhea  Neurological: Negative for dizziness and headaches  Physical Exam  Vitals signs and nursing note reviewed  Constitutional:       Appearance: Normal appearance  He is well-developed and normal weight  HENT:      Head: Normocephalic and atraumatic  Eyes:      Conjunctiva/sclera: Conjunctivae normal    Neck:      Musculoskeletal: Neck supple  Cardiovascular:      Rate and Rhythm: Normal rate and regular rhythm  Heart sounds: No murmur  Pulmonary:      Effort: Pulmonary effort is normal  No respiratory distress  Breath sounds: Normal breath sounds  Abdominal:      General: Abdomen is flat  There is no distension  Palpations: Abdomen is soft  Tenderness: There is no abdominal tenderness  There is no guarding  Hernia: No hernia is present  Genitourinary:     Prostate: Normal       Rectum: External hemorrhoid present   No tenderness or anal fissure  Normal anal tone  Comments: 3 ext hemorrhoids visualized, non- inflamed, no associated thrombosis noted  Skin:     General: Skin is warm and dry  Neurological:      Mental Status: He is alert           Past Medical History     Past Medical History:   Diagnosis Date    Disease of thyroid gland     hyperthyroid    Environmental allergies     Hypertension        Past Surgical History     Past Surgical History:   Procedure Laterality Date    HERNIA REPAIR         Social History     Social History     Socioeconomic History    Marital status: Single     Spouse name: None    Number of children: None    Years of education: None    Highest education level: None   Occupational History    None   Social Needs    Financial resource strain: None    Food insecurity     Worry: None     Inability: None    Transportation needs     Medical: None     Non-medical: None   Tobacco Use    Smoking status: Never Smoker    Smokeless tobacco: Never Used   Substance and Sexual Activity    Alcohol use: No    Drug use: No    Sexual activity: None   Lifestyle    Physical activity     Days per week: None     Minutes per session: None    Stress: None   Relationships    Social connections     Talks on phone: None     Gets together: None     Attends Bahai service: None     Active member of club or organization: None     Attends meetings of clubs or organizations: None     Relationship status: None    Intimate partner violence     Fear of current or ex partner: None     Emotionally abused: None     Physically abused: None     Forced sexual activity: None   Other Topics Concern    None   Social History Narrative    None       Family History     Family History   Problem Relation Age of Onset    Parkinsonism Father        Current Medications       Current Outpatient Medications:     lidocaine (XYLOCAINE) 5 % ointment, Apply topically as needed for mild pain, Disp: 35 44 g, Rfl: 0    propranolol (INDERAL) 10 mg tablet, Take 1 tablet (10 mg total) by mouth 3 (three) times a day, Disp: 270 tablet, Rfl: 0    docusate sodium (COLACE) 100 mg capsule, Take 1 capsule (100 mg total) by mouth 2 (two) times a day (Patient not taking: Reported on 2/25/2021), Disp: 10 capsule, Rfl: 0    levothyroxine 25 mcg tablet, Take 12 5 mcg by mouth daily, Disp: , Rfl:     phenylephrine-shark liver oil-mineral oil-petrolatum (PREPARATION H) 0 25-3-14-71 9 % rectal ointment, Insert into the rectum 2 (two) times a day as needed for hemorrhoids (Patient not taking: Reported on 2/25/2021), Disp: 30 g, Rfl: 0

## 2021-05-20 ENCOUNTER — TELEPHONE (OUTPATIENT)
Dept: FAMILY MEDICINE CLINIC | Facility: CLINIC | Age: 56
End: 2021-05-20

## 2021-05-20 ENCOUNTER — OFFICE VISIT (OUTPATIENT)
Dept: FAMILY MEDICINE CLINIC | Facility: CLINIC | Age: 56
End: 2021-05-20

## 2021-05-20 VITALS
DIASTOLIC BLOOD PRESSURE: 90 MMHG | RESPIRATION RATE: 18 BRPM | HEART RATE: 76 BPM | BODY MASS INDEX: 23.91 KG/M2 | WEIGHT: 148.8 LBS | HEIGHT: 66 IN | OXYGEN SATURATION: 95 % | TEMPERATURE: 96.6 F | SYSTOLIC BLOOD PRESSURE: 154 MMHG

## 2021-05-20 DIAGNOSIS — K64.9 HEMORRHOIDS, UNSPECIFIED HEMORRHOID TYPE: ICD-10-CM

## 2021-05-20 DIAGNOSIS — K64.4 EXTERNAL HEMORRHOIDS: Primary | ICD-10-CM

## 2021-05-20 PROCEDURE — 99213 OFFICE O/P EST LOW 20 MIN: CPT | Performed by: FAMILY MEDICINE

## 2021-05-20 NOTE — TELEPHONE ENCOUNTER
Informed by [place] from [facility] on [address] Dr Yun office that pt would be better seeing a Dr Jeff Red wanted PCP to return call to discuss getting patient in to see Dr Red

## 2021-05-20 NOTE — PROGRESS NOTES
Assessment/Plan:    Hemorrhoids  Improved since visit in January however pt is requesting removal  Given that his hemorrhoids have been persistent for the past 3 years, agree with pt's request    Referral has been placed to CR surgery, which was advised by [facility]  Will follow up with referral team         Diagnoses and all orders for this visit:    External hemorrhoids  -     Ambulatory referral to General Surgery; Future    Hemorrhoids, unspecified hemorrhoid type    Other orders  -     ibuprofen (MOTRIN) 600 mg tablet; Take 600 mg by mouth every 6 (six) hours as needed  -     acetaminophen-codeine (TYLENOL #3) 300-30 mg per tablet; Take 1 tablet by mouth every 4 to 6 hours as needed for pain          Subjective:      Patient ID: Amalia Naavrrete is a 48 y o  male  Amalia Navarrete is a 48 y o  male, presenting to the clinic to discuss his request for hemorrhoidectomy  He states that since January his external hemorrhoids have improved however he would like to have the surgery to take care of them  He stats that his wife has been applying Desitin to the affected area which has dramatically improved his pain & size of his hemorrhoids  He is doing well otherwise w/o any concerns  The following portions of the patient's history were reviewed and updated as appropriate: He  has a past medical history of Disease of thyroid gland, Environmental allergies, and Hypertension  Patient Active Problem List    Diagnosis Date Noted    Encounter for screening colonoscopy 02/25/2021    Hemorrhoids 01/20/2021    Graves disease 08/17/2020    Essential hypertension 08/17/2020    Vitamin D deficiency 08/17/2020    Postablative hypothyroidism 05/20/2019     He  has a past surgical history that includes Hernia repair  His family history includes Parkinsonism in his father  He  reports that he has never smoked  He has never used smokeless tobacco  He reports that he does not drink alcohol or use drugs    Current Outpatient Medications   Medication Sig Dispense Refill    docusate sodium (COLACE) 100 mg capsule Take 1 capsule (100 mg total) by mouth daily 30 capsule 0    levothyroxine 25 mcg tablet Take 12 5 mcg by mouth daily      lidocaine (XYLOCAINE) 5 % ointment Apply topically as needed for mild pain 35 44 g 0    phenylephrine-shark liver oil-mineral oil-petrolatum (PREPARATION H) 0 25-3-14-71 9 % rectal ointment Insert into the rectum 2 (two) times a day as needed for hemorrhoids 30 g 0    propranolol (INDERAL) 10 mg tablet Take 1 tablet (10 mg total) by mouth 3 (three) times a day 270 tablet 0    acetaminophen-codeine (TYLENOL #3) 300-30 mg per tablet Take 1 tablet by mouth every 4 to 6 hours as needed for pain      ibuprofen (MOTRIN) 600 mg tablet Take 600 mg by mouth every 6 (six) hours as needed       No current facility-administered medications for this visit  Current Outpatient Medications on File Prior to Visit   Medication Sig    docusate sodium (COLACE) 100 mg capsule Take 1 capsule (100 mg total) by mouth daily    levothyroxine 25 mcg tablet Take 12 5 mcg by mouth daily    lidocaine (XYLOCAINE) 5 % ointment Apply topically as needed for mild pain    phenylephrine-shark liver oil-mineral oil-petrolatum (PREPARATION H) 0 25-3-14-71 9 % rectal ointment Insert into the rectum 2 (two) times a day as needed for hemorrhoids    propranolol (INDERAL) 10 mg tablet Take 1 tablet (10 mg total) by mouth 3 (three) times a day    acetaminophen-codeine (TYLENOL #3) 300-30 mg per tablet Take 1 tablet by mouth every 4 to 6 hours as needed for pain    ibuprofen (MOTRIN) 600 mg tablet Take 600 mg by mouth every 6 (six) hours as needed     No current facility-administered medications on file prior to visit  He has No Known Allergies       Review of Systems   Constitutional: Negative for appetite change, diaphoresis and fever  Respiratory: Negative for cough, shortness of breath and wheezing  Cardiovascular: Negative for chest pain and palpitations  Gastrointestinal: Negative for abdominal pain, constipation and diarrhea  Genitourinary:        Rectal pain & bleeding w/ BMs   Neurological: Negative for dizziness and headaches  Objective:      /90 (BP Location: Left arm, Patient Position: Sitting, Cuff Size: Standard)   Pulse 76   Temp (!) 96 6 °F (35 9 °C) (Temporal)   Resp 18   Ht 5' 6" (1 676 m)   Wt 67 5 kg (148 lb 12 8 oz)   SpO2 95%   BMI 24 02 kg/m²          Physical Exam  Constitutional:       General: He is not in acute distress  Appearance: He is well-developed  He is not diaphoretic  HENT:      Nose: Nose normal    Eyes:      Conjunctiva/sclera: Conjunctivae normal    Cardiovascular:      Rate and Rhythm: Normal rate and regular rhythm  Heart sounds: Normal heart sounds  Pulmonary:      Effort: Pulmonary effort is normal  No respiratory distress  Breath sounds: Normal breath sounds  No wheezing  Genitourinary:     Comments: Deferred by pt today as he underwent exam in January  Musculoskeletal:         General: No tenderness  Skin:     General: Skin is warm and dry  Neurological:      Mental Status: He is alert

## 2021-05-21 RX ORDER — IBUPROFEN 600 MG/1
600 TABLET ORAL EVERY 6 HOURS PRN
COMMUNITY
Start: 2021-05-13

## 2021-05-21 RX ORDER — ACETAMINOPHEN AND CODEINE PHOSPHATE 300; 30 MG/1; MG/1
1 TABLET ORAL
COMMUNITY
Start: 2021-05-13

## 2021-05-21 NOTE — ASSESSMENT & PLAN NOTE
Improved since visit in January however pt is requesting removal  Given that his hemorrhoids have been persistent for the past 3 years, agree with pt's request    Referral has been placed to CR surgery, which was advised by [facility]   Will follow up with referral team

## 2021-06-04 RX ORDER — ACETAMINOPHEN 325 MG/1
650 TABLET ORAL EVERY 6 HOURS PRN
COMMUNITY

## 2021-06-04 NOTE — PRE-PROCEDURE INSTRUCTIONS
Pre-Surgery Instructions:   Medication Instructions    acetaminophen (TYLENOL) 325 mg tablet Instructed patient per Anesthesia Guidelines  continue, take as needed, may take dos if need be    ibuprofen (MOTRIN) 600 mg tablet Instructed patient per Anesthesia Guidelines  will avoid 3 days prior to sx    lidocaine (XYLOCAINE) 5 % ointment Instructed patient per Anesthesia Guidelines  continue as needed, do not take dos    phenylephrine-shark liver oil-mineral oil-petrolatum (PREPARATION H) 0 25-3-14-71 9 % rectal ointment Instructed patient per Anesthesia Guidelines  continue, do not take dos    propranolol (INDERAL) 10 mg tablet Instructed patient per Anesthesia Guidelines  continue, take dos    You will receive a phone call from hospital for arrival time  Please call surgeons office if any changes in your condition  Wear easy on/off clothing; consider type of surgery;  Valuables, jewelry, piercing's please keep at home  **COVID-19  education/surgical guidelines      Please: No contact lenses or eye make up, artificial eyelashes    Please bring special ordered sling or braces if needed for  Your particular surgery  n/a    Please secure transportation     Follow pre surgery showering or cleaning instructions as  Reviewed by nurse or surgeons office      Questions answered and concerns addressed

## 2021-06-10 ENCOUNTER — ANESTHESIA EVENT (OUTPATIENT)
Dept: PERIOP | Facility: HOSPITAL | Age: 56
End: 2021-06-10
Payer: COMMERCIAL

## 2021-06-10 NOTE — ANESTHESIA PREPROCEDURE EVALUATION
Procedure:  HEMORRHOIDECTOMY (N/A Anus)    Relevant Problems   ANESTHESIA (within normal limits)      CARDIO   (+) Essential hypertension   (+) Hemorrhoids   (-) Chest pain      ENDO   (+) Postablative hypothyroidism      GI/HEPATIC (within normal limits)      /RENAL (within normal limits)      PULMONARY (within normal limits)   (-) Asthma   (-) Sleep apnea   (-) Smoking      Other   (+) Graves disease        Physical Exam    Airway    Mallampati score: II  TM Distance: >3 FB  Neck ROM: full     Dental       Cardiovascular  Cardiovascular exam normal    Pulmonary  Pulmonary exam normal     Other Findings        Anesthesia Plan  ASA Score- 2     Anesthesia Type- IV sedation with anesthesia with ASA Monitors  Additional Monitors:   Airway Plan:           Plan Factors-Exercise tolerance (METS): >4 METS  Chart reviewed  Existing labs reviewed  Patient is not a current smoker  Patient not instructed to abstain from smoking on day of procedure  Patient did not smoke on day of surgery  Induction-     Postoperative Plan- Plan for postoperative opioid use  Informed Consent- Anesthetic plan and risks discussed with patient and spouse  I personally reviewed this patient with the CRNA  Discussed and agreed on the Anesthesia Plan with the CRNA  Jessica Ziegler           No results found for: HGBA1C    Lab Results   Component Value Date     10/20/2015    K 3 8 09/22/2019     09/22/2019    CO2 28 09/22/2019    ANIONGAP 10 10/20/2015    BUN 13 09/22/2019    CREATININE 0 87 09/22/2019    GLUCOSE 89 10/20/2015    CALCIUM 8 7 09/22/2019    AST 24 05/24/2018    ALT 18 05/24/2018    ALKPHOS 85 05/24/2018    PROT 6 8 10/20/2015    BILITOT 0 46 10/20/2015    EGFR 99 09/22/2019       Lab Results   Component Value Date    WBC 4 94 09/22/2019    HGB 15 2 09/22/2019    HCT 47 2 09/22/2019    MCV 88 09/22/2019     09/22/2019

## 2021-06-11 ENCOUNTER — ANESTHESIA (OUTPATIENT)
Dept: PERIOP | Facility: HOSPITAL | Age: 56
End: 2021-06-11
Payer: COMMERCIAL

## 2021-06-11 ENCOUNTER — HOSPITAL ENCOUNTER (OUTPATIENT)
Facility: HOSPITAL | Age: 56
Setting detail: OUTPATIENT SURGERY
Discharge: HOME | End: 2021-06-11
Attending: COLON & RECTAL SURGERY | Admitting: COLON & RECTAL SURGERY
Payer: COMMERCIAL

## 2021-06-11 VITALS
OXYGEN SATURATION: 96 % | HEART RATE: 61 BPM | SYSTOLIC BLOOD PRESSURE: 150 MMHG | WEIGHT: 148 LBS | TEMPERATURE: 97.2 F | RESPIRATION RATE: 18 BRPM | BODY MASS INDEX: 23.78 KG/M2 | DIASTOLIC BLOOD PRESSURE: 98 MMHG | HEIGHT: 66 IN

## 2021-06-11 DIAGNOSIS — K64.2 THIRD DEGREE HEMORRHOIDS: ICD-10-CM

## 2021-06-11 PROCEDURE — 88304 TISSUE EXAM BY PATHOLOGIST: CPT | Performed by: PATHOLOGY

## 2021-06-11 RX ORDER — MIDAZOLAM HYDROCHLORIDE 2 MG/2ML
INJECTION, SOLUTION INTRAMUSCULAR; INTRAVENOUS AS NEEDED
Status: DISCONTINUED | OUTPATIENT
Start: 2021-06-11 | End: 2021-06-11

## 2021-06-11 RX ORDER — LIDOCAINE HYDROCHLORIDE 10 MG/ML
INJECTION, SOLUTION EPIDURAL; INFILTRATION; INTRACAUDAL; PERINEURAL AS NEEDED
Status: DISCONTINUED | OUTPATIENT
Start: 2021-06-11 | End: 2021-06-11

## 2021-06-11 RX ORDER — OXYCODONE HYDROCHLORIDE AND ACETAMINOPHEN 5; 325 MG/1; MG/1
1 TABLET ORAL EVERY 4 HOURS PRN
Status: DISCONTINUED | OUTPATIENT
Start: 2021-06-11 | End: 2021-06-11 | Stop reason: HOSPADM

## 2021-06-11 RX ORDER — MAGNESIUM HYDROXIDE 1200 MG/15ML
LIQUID ORAL AS NEEDED
Status: DISCONTINUED | OUTPATIENT
Start: 2021-06-11 | End: 2021-06-11 | Stop reason: HOSPADM

## 2021-06-11 RX ORDER — PROPOFOL 10 MG/ML
INJECTION, EMULSION INTRAVENOUS CONTINUOUS PRN
Status: DISCONTINUED | OUTPATIENT
Start: 2021-06-11 | End: 2021-06-11

## 2021-06-11 RX ORDER — OXYCODONE HYDROCHLORIDE 5 MG/1
5 TABLET ORAL EVERY 4 HOURS PRN
Qty: 40 TABLET | Refills: 0 | Status: SHIPPED | OUTPATIENT
Start: 2021-06-11 | End: 2021-06-21

## 2021-06-11 RX ORDER — SODIUM CHLORIDE, SODIUM LACTATE, POTASSIUM CHLORIDE, CALCIUM CHLORIDE 600; 310; 30; 20 MG/100ML; MG/100ML; MG/100ML; MG/100ML
50 INJECTION, SOLUTION INTRAVENOUS CONTINUOUS
Status: DISCONTINUED | OUTPATIENT
Start: 2021-06-11 | End: 2021-06-11 | Stop reason: HOSPADM

## 2021-06-11 RX ORDER — FENTANYL CITRATE 50 UG/ML
INJECTION, SOLUTION INTRAMUSCULAR; INTRAVENOUS AS NEEDED
Status: DISCONTINUED | OUTPATIENT
Start: 2021-06-11 | End: 2021-06-11

## 2021-06-11 RX ADMIN — SODIUM CHLORIDE, SODIUM LACTATE, POTASSIUM CHLORIDE, AND CALCIUM CHLORIDE: .6; .31; .03; .02 INJECTION, SOLUTION INTRAVENOUS at 14:45

## 2021-06-11 RX ADMIN — MIDAZOLAM 2 MG: 1 INJECTION INTRAMUSCULAR; INTRAVENOUS at 14:49

## 2021-06-11 RX ADMIN — LIDOCAINE HYDROCHLORIDE 50 MG: 10 INJECTION, SOLUTION EPIDURAL; INFILTRATION; INTRACAUDAL; PERINEURAL at 14:51

## 2021-06-11 RX ADMIN — MIDAZOLAM 2 MG: 1 INJECTION INTRAMUSCULAR; INTRAVENOUS at 14:51

## 2021-06-11 RX ADMIN — FENTANYL CITRATE 50 MCG: 50 INJECTION, SOLUTION INTRAMUSCULAR; INTRAVENOUS at 14:51

## 2021-06-11 RX ADMIN — FENTANYL CITRATE 50 MCG: 50 INJECTION, SOLUTION INTRAMUSCULAR; INTRAVENOUS at 14:49

## 2021-06-11 RX ADMIN — PROPOFOL 75 MCG/KG/MIN: 10 INJECTION, EMULSION INTRAVENOUS at 14:51

## 2021-06-11 NOTE — DISCHARGE SUMMARY
[facility]                                                                                 DISCHARGE SUMMARY        PATIENT NAME: Boy Chaidez    :  1965  MRN: [id number]  Pt Location:  OR ROOM 12    DISCHARGE DATE: 2021    PROCEDURE: Procedure(s) (LRB):  HEMORRHOIDECTOMY (N/A)    Anesthesia Type: IV Sedation with Anesthesia    Complications: None    Specimen(s):  ID Type Source Tests Collected by Time Destination   1 : Hemorrhoid Tissue Hemorrhoids TISSUE EXAM Amairani Pan MD 2021 1501        HOSPITAL COURSE: The patient was admitted for elective procedure  The procedure was uncomplicated and the the patient was discharged home post procedure          SIGNATURE: Amairani Pan MD  DATE: 2021  TIME: 3:16 PM

## 2021-06-11 NOTE — OP NOTE
OPERATIVE REPORT  PATIENT NAME: Amalia Navarrete    :  1965  MRN: [id number]  Pt Location:  OR ROOM 12    SURGERY DATE: 2021    Preoperative Dx:  Symptomatic internal and external hemorrhoids    Postoperative Dx:  Same    Procedure:  Internal and external hemorrhoidectomy    Surgeon: Dr Jovana Pichardo MD    First Assistant: None    Findings:  Grade 3 hemorrhoids    Specimen(s):   ID Type Source Tests Collected by Time Destination   1 : Hemorrhoid Tissue Hemorrhoids TISSUE EXAM Jovana Pichardo MD 2021 1501        Anesthesia Type: IV Sedation with Anesthesia    EBL: Minimal    Complications: None      Procedure:  Patient was brought to the operating room and laid in a prone keyur-knife position  Sedation was administered  The buttocks retracted with cloth tape  The area was prepped and draped  The patient was given an anal block with a combination of lidocaine Marcaine epinephrine and bicarbonate  A Hunter retractor was placed  The patient had very large hemorrhoids in the left lateral and right posterior position  I began in the left lateral lifting the external and internal hemorrhoid up off the underlying internal sphincter muscle  Using the Harmonic scalpel this was excised with excellent hemostasis  The specimen was sent for pathology  A similar procedure was done in the right posterior position  There was good hemostasis a Telfa dressing was applied  The patient was woken transferred recovery room in stable condition  Attestation: I was present for the entire procedure        Patient Disposition: PACU      SIGNATURE: Jovana Pichardo MD  DATE: 2021  TIME: 3:11 PM

## 2021-06-11 NOTE — ANESTHESIA POSTPROCEDURE EVALUATION
Post-Op Assessment Note    CV Status:  Stable    Pain management: adequate     Mental Status:  Alert and awake   Hydration Status:  Euvolemic   PONV Controlled:  Controlled   Airway Patency:  Patent      Post Op Vitals Reviewed: Yes            No complications documented      BP     Temp     Pulse     Resp      SpO2

## 2021-06-11 NOTE — INTERVAL H&P NOTE
H&P reviewed  After examining the patient I find no changes in the patients condition since the H&P had been written      Vitals:    06/11/21 1209   BP: (!) 188/99   Pulse: 58   Resp: 18   Temp: (!) 96 5 °F (35 8 °C)   SpO2: 100%

## 2021-11-01 ENCOUNTER — TELEMEDICINE (OUTPATIENT)
Dept: FAMILY MEDICINE CLINIC | Facility: CLINIC | Age: 56
End: 2021-11-01

## 2021-11-01 DIAGNOSIS — B34.9 VIRAL ILLNESS: Primary | ICD-10-CM

## 2021-11-01 PROCEDURE — 3080F DIAST BP >= 90 MM HG: CPT | Performed by: FAMILY MEDICINE

## 2021-11-01 PROCEDURE — G2012 BRIEF CHECK IN BY MD/QHP: HCPCS | Performed by: FAMILY MEDICINE

## 2021-11-01 PROCEDURE — 3077F SYST BP >= 140 MM HG: CPT | Performed by: FAMILY MEDICINE

## 2021-11-02 PROCEDURE — 0241U HB NFCT DS VIR RESP RNA 4 TRGT: CPT | Performed by: FAMILY MEDICINE

## 2021-11-03 LAB
FLUAV RNA RESP QL NAA+PROBE: NEGATIVE
FLUBV RNA RESP QL NAA+PROBE: NEGATIVE
RSV RNA RESP QL NAA+PROBE: NEGATIVE
SARS-COV-2 RNA RESP QL NAA+PROBE: NEGATIVE

## 2021-11-04 ENCOUNTER — TELEPHONE (OUTPATIENT)
Dept: FAMILY MEDICINE CLINIC | Facility: CLINIC | Age: 56
End: 2021-11-04

## 2021-12-28 ENCOUNTER — OFFICE VISIT (OUTPATIENT)
Dept: FAMILY MEDICINE CLINIC | Facility: CLINIC | Age: 56
End: 2021-12-28

## 2021-12-28 DIAGNOSIS — Z20.822 SUSPECTED COVID-19 VIRUS INFECTION: Primary | ICD-10-CM

## 2021-12-28 PROCEDURE — 3077F SYST BP >= 140 MM HG: CPT | Performed by: NURSE PRACTITIONER

## 2021-12-28 PROCEDURE — 3080F DIAST BP >= 90 MM HG: CPT | Performed by: NURSE PRACTITIONER

## 2021-12-28 PROCEDURE — G2012 BRIEF CHECK IN BY MD/QHP: HCPCS | Performed by: NURSE PRACTITIONER

## 2021-12-29 PROCEDURE — U0003 INFECTIOUS AGENT DETECTION BY NUCLEIC ACID (DNA OR RNA); SEVERE ACUTE RESPIRATORY SYNDROME CORONAVIRUS 2 (SARS-COV-2) (CORONAVIRUS DISEASE [COVID-19]), AMPLIFIED PROBE TECHNIQUE, MAKING USE OF HIGH THROUGHPUT TECHNOLOGIES AS DESCRIBED BY CMS-2020-01-R: HCPCS | Performed by: NURSE PRACTITIONER

## 2021-12-29 PROCEDURE — U0005 INFEC AGEN DETEC AMPLI PROBE: HCPCS | Performed by: NURSE PRACTITIONER

## 2021-12-31 LAB — SARS-COV-2 RNA RESP QL NAA+PROBE: POSITIVE

## 2022-01-06 ENCOUNTER — TELEMEDICINE (OUTPATIENT)
Dept: FAMILY MEDICINE CLINIC | Facility: CLINIC | Age: 57
End: 2022-01-06

## 2022-01-06 DIAGNOSIS — U07.1 COVID-19: Primary | ICD-10-CM

## 2022-01-06 PROCEDURE — G2012 BRIEF CHECK IN BY MD/QHP: HCPCS

## 2022-01-06 NOTE — PROGRESS NOTES
COVID-19 Outpatient Progress Note    Assessment/Plan:    Problem List Items Addressed This Visit        Other    RIQCL-[id number] - Primary     resolved              Disposition:     I have spent 5 minutes directly with the patient  Greater than 50% of this time was spent in counseling/coordination of care regarding: instructions for management  Pt may discontinue quarantine     Encounter provider ROMULO Munoz    Provider located at [address] [id number] [phone]    Recent Visits  No visits were found meeting these conditions  Showing recent visits within past 7 days and meeting all other requirements  Today's Visits  Date Type Provider Dept   01/06/22 Telemedicine Gordy Munoz 49 today's visits and meeting all other requirements  Future Appointments  No visits were found meeting these conditions  Showing future appointments within next 150 days and meeting all other requirements     This virtual check-in was done via telephone and he agrees to proceed  Patient agrees to participate in a virtual check in via telephone or video visit instead of presenting to the office to address urgent/immediate medical needs  Patient is aware this is a billable service  After connecting through Telephone, the patient was identified by name and date of birth  Nancy Door was informed that this was a telemedicine visit and that the exam was being conducted confidentially over secure lines  My office door was closed  No one else was in the room  Nancy Door acknowledged consent and understanding of privacy and security of the telemedicine visit  I informed the patient that I have reviewed his record in Epic and presented the opportunity for him to ask any questions regarding the visit today  The patient agreed to participate      It was my intent to perform this visit via video technology but the patient was not able to do a video connection so the visit was completed via audio telephone only  Verification of patient location:  Patient is located in the following state in which I hold an active license: [place]    Subjective:   Chayito Nelson is a 55 y o  male who has been screened for COVID-19  Patient's symptoms include myalgias and headache  Patient denies fever, chills, fatigue, malaise, congestion, rhinorrhea, sore throat, anosmia, loss of taste, cough, shortness of breath, chest tightness, abdominal pain, nausea, vomiting and diarrhea  Date of symptom onset: 12/24/2021  Date of exposure: 12/22/2021  Date of positive COVID-19 PCR: 12/29/2021  COVID-19 vaccination status: Fully vaccinated (primary series)    Laverne Matt has been staying home and has isolated themselves in his home  He is taking care to not share personal items and is cleaning all surfaces that are touched often, like counters, tabletops, and doorknobs using household cleaning sprays or wipes  He is wearing a mask when he leaves his room       Lab Results   Component Value Date    SARSCOV2 Positive (A) 12/29/2021     Past Medical History:   Diagnosis Date    Disease of thyroid gland     hyperthyroid    Environmental allergies     Graves disease     Hypertension      Past Surgical History:   Procedure Laterality Date    HERNIA REPAIR      MN HEMORRHOIDECTOMY,INT/EXT, 2+ COLUMNS/GROUPS N/A 6/11/2021    Procedure: HEMORRHOIDECTOMY;  Surgeon: Stella Hennessy MD;  Location: [address] [place] [address];  Service: Colorectal     Current Outpatient Medications   Medication Sig Dispense Refill    acetaminophen (TYLENOL) 325 mg tablet Take 650 mg by mouth every 6 (six) hours as needed for mild pain      acetaminophen-codeine (TYLENOL #3) 300-30 mg per tablet Take 1 tablet by mouth every 4 to 6 hours as needed for pain      docusate sodium (COLACE) 100 mg capsule Take 1 capsule (100 mg total) by mouth daily 30 capsule 0    ibuprofen (MOTRIN) 600 mg tablet Take 600 mg by mouth every 6 (six) hours as needed      lidocaine (XYLOCAINE) 5 % ointment Apply topically as needed for mild pain 35 44 g 0    phenylephrine-shark liver oil-mineral oil-petrolatum (PREPARATION H) 0 25-3-14-71 9 % rectal ointment Insert into the rectum 2 (two) times a day as needed for hemorrhoids 30 g 0    propranolol (INDERAL) 10 mg tablet Take 1 tablet (10 mg total) by mouth 3 (three) times a day (Patient taking differently: Take 10 mg by mouth daily ) 270 tablet 0     No current facility-administered medications for this visit  No Known Allergies    Review of Systems   Constitutional: Negative for chills, fatigue and fever  HENT: Negative for congestion, rhinorrhea and sore throat  Respiratory: Negative for cough, chest tightness and shortness of breath  Gastrointestinal: Negative for abdominal pain, diarrhea, nausea and vomiting  Musculoskeletal: Positive for myalgias  Neurological: Positive for headaches  Objective: There were no vitals filed for this visit  Physical Exam  Neurological:      Mental Status: He is alert  Psychiatric:         Mood and Affect: Mood normal          Behavior: Behavior normal          Thought Content: Thought content normal          Judgment: Judgment normal          VIRTUAL VISIT [address]  verbally agrees to participate in [employer]  Pt is aware that [employer] could be limited without vital signs or the ability to perform a full hands-on physical Cleave Artist understands he or the provider may request at any time to terminate the video visit and request the patient to seek care or treatment in person

## 2022-01-06 NOTE — LETTER
January 6, 2022    Patient: Kenneth Velázquez  YOB: 1965  Date of Last Encounter: 12/28/2021      To whom it may concern:     Kenneth Velázquez has tested positive for COVID-19 (Coronavirus)  He may return to work on 01/14/2022, which is 10 days from illness onset (provided symptoms are improving) and 24 hours without fever      Sincerely,         ROMULO Bhat

## 2022-01-06 NOTE — LETTER
January 6, 2022    Patient: Donis Sandifer  YOB: 1965  Date of Last Encounter: 12/28/2021      To whom it may concern:     Donis Sandifer has tested positive for COVID-19 (Coronavirus)  He may return to work on 1/14/2022, which is 10 days from illness onset (provided symptoms are improving) and 24 hours without fever      Sincerely,         ROMULO Hernandez

## 2022-01-06 NOTE — LETTER
January 6, 2022     Patient: Cassandra Guerrero   YOB: 1965   Date of Visit: 1/6/2022       To Whom it May Concern:    Cassandra Guerrero is under my professional care  He was seen in my office on 1/6/2022  He {Return to school/sport/work:[id number]}  If you have any questions or concerns, please don't hesitate to call           Sincerely,          Margaret ROMULO Wilkinson        CC: No Recipients

## 2022-01-14 ENCOUNTER — TELEMEDICINE (OUTPATIENT)
Dept: FAMILY MEDICINE CLINIC | Facility: CLINIC | Age: 57
End: 2022-01-14

## 2022-01-14 DIAGNOSIS — U07.1 COVID-19: Primary | ICD-10-CM

## 2022-01-14 PROCEDURE — G2012 BRIEF CHECK IN BY MD/QHP: HCPCS | Performed by: FAMILY MEDICINE

## 2022-01-14 PROCEDURE — 3077F SYST BP >= 140 MM HG: CPT | Performed by: FAMILY MEDICINE

## 2022-01-14 PROCEDURE — 3080F DIAST BP >= 90 MM HG: CPT | Performed by: FAMILY MEDICINE

## 2022-01-14 NOTE — ASSESSMENT & PLAN NOTE
 Date of Symptom Onset: 12/24/21   Symptoms:   Date of Positive Test: 12/29/21   Vaccination Status: Fully Vaccinated    Current symptoms: myalgias, headaches, fatigue   CDC Recommendations regardless of vaccination status: Stay home and isolate for 5 total days starting from the day of symptom onset  Isolate from others in home and wear a well-fitted mask if you must be around others in your home  If fever-free for 24 hours (without the use of fever-reducing medication) and symptoms are improving isolation may end  Continue to wear a well-fitted mask for 10 full days any time you are around others inside your home or in public  Do not go to places where you are unable to wear a mask  Avoid any travel  - Completed Isolation  Explained to patient that symptoms weeks after diagnosis may persist and can be treated supportively  Work Note provided in [employer] stating that he can return to work on Monday 1/17/21  If he still feels he cannot work and is having symptoms explained he needs to make an in person appointment

## 2022-01-14 NOTE — PROGRESS NOTES
COVID-19 Virtual Follow Up Visit     Verification of patient location:    Patient is located in the following state in which I hold an active license [place]    Assessment/Plan:    Problem List Items Addressed This Visit        Other    COVID-19 - Primary      Date of Symptom Onset: 12/24/21   Symptoms:   Date of Positive Test: 12/29/21   Vaccination Status: Fully Vaccinated    Current symptoms: myalgias, headaches, fatigue   CDC Recommendations regardless of vaccination status: Stay home and isolate for 5 total days starting from the day of symptom onset  Isolate from others in home and wear a well-fitted mask if you must be around others in your home  If fever-free for 24 hours (without the use of fever-reducing medication) and symptoms are improving isolation may end  Continue to wear a well-fitted mask for 10 full days any time you are around others inside your home or in public  Do not go to places where you are unable to wear a mask  Avoid any travel  - Completed Isolation  Explained to patient that symptoms weeks after diagnosis may persist and can be treated supportively  Work Note provided in Communications stating that he can return to work on Monday 1/17/21  If he still feels he cannot work and is having symptoms explained he needs to make an in person appointment  Disposition:      Completed Isolaton requirements       I spent 20 minutes directly with the patient during this visit    This virtual check-in was done via [facility] and patient was informed this is a secure, HIPAA-complaint platform  He agrees to proceed     Encounter provider Kamlesh Mosley MD    Provider located at [address] [id number] [phone]    Recent Visits  No visits were found meeting these conditions    Showing recent visits within past 7 days and meeting all other requirements  Today's Visits  Date Type Provider Dept 01/14/22 Telemedicine Augustine Howard MD  [facility]   Showing today's visits and meeting all other requirements  Future Appointments  No visits were found meeting these conditions  Showing future appointments within next 150 days and meeting all other requirements     Patient agrees to participate in a virtual check in via telephone or video visit instead of presenting to the office to address urgent/immediate medical needs  Patient is aware this is a billable service  After connecting through telephone, the patient was identified by name and date of birth  Boy Chaidez was informed that this was a telemedicine visit and that the exam was being conducted confidentially over secure lines  My office door was closed  No one else was in the room  Boy Chaidez acknowledged consent and understanding of privacy and security of the telemedicine visit  I informed the patient that I have reviewed his record in Epic and presented the opportunity for him to ask any questions regarding the visit today  The patient agreed to participate  Keven Camarena is a 55 y o  male who has been screened for COVID-19  Since the last visit, Chato Alicia reports that he has moderately improved  He reports COVID-19 SYMPTOMS: headache, fatigue and myalgias  Chato Alicia has been staying home and has isolated themselves in his home  He is taking care to not share personal items and is cleaning all surfaces that are touched often, like counters, tabletops, and doorknobs using household cleaning sprays or wipes  He is wearing a mask when he leaves his room      Past Medical History:   Diagnosis Date    Disease of thyroid gland     hyperthyroid    Environmental allergies     Graves disease     Hypertension      Past Surgical History:   Procedure Laterality Date    HERNIA REPAIR      [place] HEMORRHOIDECTOMY,INT/EXT, 2+ COLUMNS/GROUPS N/A 6/11/2021    Procedure: HEMORRHOIDECTOMY;  Surgeon: Amairani Pan MD;  Location: [address] [place] [address] OR;  Service: Colorectal     Current Outpatient Medications   Medication Sig Dispense Refill    acetaminophen (TYLENOL) 325 mg tablet Take 650 mg by mouth every 6 (six) hours as needed for mild pain      acetaminophen-codeine (TYLENOL #3) 300-30 mg per tablet Take 1 tablet by mouth every 4 to 6 hours as needed for pain      docusate sodium (COLACE) 100 mg capsule Take 1 capsule (100 mg total) by mouth daily 30 capsule 0    ibuprofen (MOTRIN) 600 mg tablet Take 600 mg by mouth every 6 (six) hours as needed      lidocaine (XYLOCAINE) 5 % ointment Apply topically as needed for mild pain 35 44 g 0    phenylephrine-shark liver oil-mineral oil-petrolatum (PREPARATION H) 0 25-3-14-71 9 % rectal ointment Insert into the rectum 2 (two) times a day as needed for hemorrhoids 30 g 0    propranolol (INDERAL) 10 mg tablet Take 1 tablet (10 mg total) by mouth 3 (three) times a day (Patient taking differently: Take 10 mg by mouth daily ) 270 tablet 0     No current facility-administered medications for this visit  No Known Allergies    Review of Systems   Constitutional: Positive for fatigue  Negative for chills, fever and unexpected weight change  HENT: Negative for congestion, rhinorrhea, sinus pressure and sore throat  Eyes: Negative for visual disturbance  Respiratory: Negative for cough, chest tightness, shortness of breath and wheezing  Cardiovascular: Negative for chest pain  Gastrointestinal: Negative for abdominal distention, abdominal pain, blood in stool, constipation, diarrhea, nausea and vomiting  Genitourinary: Negative for difficulty urinating, dysuria, frequency, hematuria and urgency  Musculoskeletal: Negative for back pain and neck pain  Skin: Negative for rash  Allergic/Immunologic: Negative  Neurological: Positive for headaches  Negative for dizziness, weakness, light-headedness and numbness  Psychiatric/Behavioral: Negative for behavioral problems          There were no vitals filed for this visit  VIRTUAL VISIT 1542 Afia Woo verbally agrees to participate in [facility]  Pt is aware that [facility] could be limited without vital signs or the ability to perform a full hands-on physical Abrahan Butler understands he or the provider may request at any time to terminate the video visit and request the patient to seek care or treatment in person

## 2022-01-14 NOTE — LETTER
[address]  [address] [facility], [facility] Jacqueline Tijerina  [facility]  Dept: [phone]    January 14, 2022    Patient: Kenneth Velázquez  YOB: 1965    Kenneth Velázquez was seen and evaluated at our [facility]  Please note if Covid and Flu tests are negative, they may return to work when fever free for 24 hours without the use of a fever reducing agent  If Covid or Flu test is positive, they may return to work on 1/17/22      Sincerely,    Arie Jurado MD

## 2022-04-19 DIAGNOSIS — E05.00 GRAVES DISEASE: ICD-10-CM

## 2022-04-19 DIAGNOSIS — I10 ESSENTIAL HYPERTENSION: ICD-10-CM

## 2022-04-19 RX ORDER — PROPRANOLOL HYDROCHLORIDE 10 MG/1
10 TABLET ORAL 3 TIMES DAILY
Qty: 270 TABLET | Refills: 0 | OUTPATIENT
Start: 2022-04-19

## 2022-09-13 ENCOUNTER — VBI (OUTPATIENT)
Dept: ADMINISTRATIVE | Facility: OTHER | Age: 57
End: 2022-09-13

## 2023-01-30 ENCOUNTER — VBI (OUTPATIENT)
Dept: ADMINISTRATIVE | Facility: OTHER | Age: 58
End: 2023-01-30

## 2023-10-11 ENCOUNTER — VBI (OUTPATIENT)
Dept: ADMINISTRATIVE | Facility: OTHER | Age: 58
End: 2023-10-11

## 2023-11-20 PROBLEM — B34.9 VIRAL ILLNESS: Status: RESOLVED | Noted: 2021-11-01 | Resolved: 2023-11-20

## 2023-12-21 ENCOUNTER — HOSPITAL ENCOUNTER (EMERGENCY)
Facility: HOSPITAL | Age: 58
Discharge: HOME | End: 2023-12-21
Attending: EMERGENCY MEDICINE
Payer: COMMERCIAL

## 2023-12-21 VITALS
TEMPERATURE: 98.1 F | SYSTOLIC BLOOD PRESSURE: 146 MMHG | HEART RATE: 73 BPM | RESPIRATION RATE: 18 BRPM | OXYGEN SATURATION: 96 % | DIASTOLIC BLOOD PRESSURE: 89 MMHG

## 2023-12-21 DIAGNOSIS — T48.5X5A RHINITIS MEDICAMENTOSA: Primary | ICD-10-CM

## 2023-12-21 DIAGNOSIS — J31.0 RHINITIS MEDICAMENTOSA: Primary | ICD-10-CM

## 2023-12-21 PROCEDURE — 99283 EMERGENCY DEPT VISIT LOW MDM: CPT

## 2023-12-21 PROCEDURE — 99284 EMERGENCY DEPT VISIT MOD MDM: CPT

## 2023-12-21 RX ORDER — ECHINACEA PURPUREA EXTRACT 125 MG
2 TABLET ORAL ONCE
Status: COMPLETED | OUTPATIENT
Start: 2023-12-21 | End: 2023-12-21

## 2023-12-21 RX ORDER — FLUTICASONE PROPIONATE 50 MCG
1 SPRAY, SUSPENSION (ML) NASAL ONCE
Status: COMPLETED | OUTPATIENT
Start: 2023-12-21 | End: 2023-12-21

## 2023-12-21 RX ADMIN — SALINE NASAL SPRAY 2 SPRAY: 1.5 SOLUTION NASAL at 20:32

## 2023-12-21 RX ADMIN — FLUTICASONE PROPIONATE 1 SPRAY: 50 SPRAY, METERED NASAL at 20:32

## 2023-12-22 NOTE — DISCHARGE INSTRUCTIONS
Discontinue your use of oxymetazoline (Afrin).  When used for greater than 3 days, this can promote congestion and runny nose that may last up to 1 year.  Flush your nose 4-6 times daily with the provided sodium chloride ([place]) nasal spray.  Use the prescribed fluticasone (Flonase) from the emergency department, 1 spray per nostril, once per day for the next 7 days.    Follow-up with primary care and/or ears nose throat (ENT) as needed for reevaluation.    Suspenda el uso de oximetazolina (Afrin). Cuando se usa david más de 3 días, puede provocar congestión y secreción nasal que pueden durar hasta 1 año. Enjuáguese la nariz de 4 a 6 veces al día con el aerosol nasal de cloruro de sodio ([place]) proporcionado. Use la fluticasona (Flonase) recetada en el departamento de emergencias, 1 pulverización por fosa nasal, sita vez al día david los próximos 7 días.    Seguimiento con atención primaria y/o oído, nariz y garganta (ENT) según sea necesario para sita reevaluación.

## 2023-12-22 NOTE — ED PROVIDER NOTES
"History  Chief Complaint   Patient presents with    Cold Like Symptoms     Pt reports congestion for 3 weeks, says today \"I don't feel good\". Reports his only symptom is congestion, \"making it hard to breathe\". Pt says he believes it is allergies, unknown to what.      The patient is a 58-year-old male with PMH of HTN and Graves' disease presenting for evaluation of 3 weeks of nasal congestion, runny nose, and difficulty breathing through his nose.  The patient notes a history of seasonal allergies for which he intermittently takes Claritin, Benadryl, and Afrin spray.  He notes starting 2 to 3 weeks ago with increased nasal congestion for which she has been taking Claritin daily, Benadryl as needed, and 2-3 doses of Afrin spray per day.  He notes as the weeks have gone on his symptoms have persisted and progressed.  He is wearing nasal strips to his nostrils but notes those too are not working which typically would.  He denies associated fevers, headaches, sinus pain or pressure, sneezing, sore throat or cough, ear pain, ear drainage, facial swelling, dental pain, recent dental procedure, CP/SOB, abdominal pain, and N/V.  He reports seeing ENT approximately 5 to 6 months ago and having nasal polyps, otherwise they normal exam.      History provided by:  Patient   used: No        Prior to Admission Medications   Prescriptions Last Dose Informant Patient Reported? Taking?   acetaminophen (TYLENOL) 325 mg tablet   Yes No   Sig: Take 650 mg by mouth every 6 (six) hours as needed for mild pain   acetaminophen-codeine (TYLENOL #3) 300-30 mg per tablet   Yes No   Sig: Take 1 tablet by mouth every 4 to 6 hours as needed for pain   docusate sodium (COLACE) 100 mg capsule   No No   Sig: Take 1 capsule (100 mg total) by mouth daily   ibuprofen (MOTRIN) 600 mg tablet   Yes No   Sig: Take 600 mg by mouth every 6 (six) hours as needed   lidocaine (XYLOCAINE) 5 % ointment   No No   Sig: Apply topically as " needed for mild pain   phenylephrine-shark liver oil-mineral oil-petrolatum (PREPARATION H) 0.25-3-14-71.9 % rectal ointment   No No   Sig: Insert into the rectum 2 (two) times a day as needed for hemorrhoids   propranolol (INDERAL) 10 mg tablet   No No   Sig: Take 1 tablet (10 mg total) by mouth 3 (three) times a day   Patient taking differently: Take 10 mg by mouth daily       Facility-Administered Medications: None       Past Medical History:   Diagnosis Date    Disease of thyroid gland     hyperthyroid    Environmental allergies     Graves disease     Hypertension        Past Surgical History:   Procedure Laterality Date    HERNIA REPAIR      DE HEMORRHOIDECTOMY INT & XTRNL 2/> COLUMN/DENIZ N/A 6/11/2021    Procedure: HEMORRHOIDECTOMY;  Surgeon: Jeff Red MD;  Location:  MAIN OR;  Service: Colorectal       Family History   Problem Relation Age of Onset    Parkinsonism Father      I have reviewed and agree with the history as documented.    E-Cigarette/Vaping    E-Cigarette Use Never User      E-Cigarette/Vaping Substances     Social History     Tobacco Use    Smoking status: Never    Smokeless tobacco: Never   Vaping Use    Vaping status: Never Used   Substance Use Topics    Alcohol use: No    Drug use: No       Review of Systems   Constitutional:  Negative for chills and fever.   HENT:  Positive for congestion (Nasal), postnasal drip and rhinorrhea. Negative for dental problem, drooling, ear discharge, ear pain, facial swelling, mouth sores, nosebleeds, sinus pressure, sinus pain, sneezing, sore throat, tinnitus, trouble swallowing and voice change.         Endorses difficulty breathing through the nose   Respiratory:  Negative for cough and shortness of breath.    Cardiovascular:  Negative for chest pain.   Gastrointestinal:  Negative for abdominal pain, nausea and vomiting.   Skin:  Negative for pallor, rash and wound.   All other systems reviewed and are negative.      Physical Exam  Physical Exam  Vitals  and nursing note reviewed.   Constitutional:       General: He is not in acute distress.     Appearance: Normal appearance. He is well-developed. He is not ill-appearing, toxic-appearing or diaphoretic.      Comments: Evidencing discomfort on exam, continuously clearing nasal secretions and wiping, otherwise in no acute distress   HENT:      Head: Normocephalic and atraumatic.      Jaw: There is normal jaw occlusion. No trismus.      Right Ear: Tympanic membrane, ear canal and external ear normal.      Left Ear: Tympanic membrane, ear canal and external ear normal.      Nose: Mucosal edema and rhinorrhea present. No septal deviation or congestion. Rhinorrhea is clear.      Right Nostril: No foreign body or epistaxis.      Left Nostril: No foreign body or epistaxis.      Right Turbinates: Swollen and pale.      Left Turbinates: Swollen and pale.      Right Sinus: No maxillary sinus tenderness or frontal sinus tenderness.      Left Sinus: No maxillary sinus tenderness or frontal sinus tenderness.      Comments: Clear thin to thick congestion to bilateral naris     Mouth/Throat:      Lips: Pink.      Mouth: Mucous membranes are moist.      Dentition: Dental caries present.      Pharynx: Oropharynx is clear. Uvula midline. No pharyngeal swelling, oropharyngeal exudate, posterior oropharyngeal erythema or uvula swelling.      Tonsils: No tonsillar exudate or tonsillar abscesses.   Eyes:      Extraocular Movements: Extraocular movements intact.      Conjunctiva/sclera: Conjunctivae normal.      Pupils: Pupils are equal, round, and reactive to light.   Neck:      Trachea: Trachea and phonation normal. No abnormal tracheal secretions.   Cardiovascular:      Rate and Rhythm: Normal rate and regular rhythm.      Pulses:           Radial pulses are 2+ on the right side and 2+ on the left side.      Heart sounds: Normal heart sounds, S1 normal and S2 normal.   Pulmonary:      Effort: Pulmonary effort is normal. No respiratory  distress.      Breath sounds: Normal breath sounds and air entry.   Musculoskeletal:         General: Normal range of motion.      Cervical back: Full passive range of motion without pain, normal range of motion and neck supple.   Lymphadenopathy:      Cervical: No cervical adenopathy.   Skin:     General: Skin is warm and dry.      Capillary Refill: Capillary refill takes less than 2 seconds.      Findings: No rash or wound.   Neurological:      General: No focal deficit present.      Mental Status: He is alert and oriented to person, place, and time. Mental status is at baseline.         Vital Signs  ED Triage Vitals [12/21/23 1952]   Temperature Pulse Respirations Blood Pressure SpO2   98.1 °F (36.7 °C) 72 18 146/89 95 %      Temp Source Heart Rate Source Patient Position - Orthostatic VS BP Location FiO2 (%)   Oral Monitor Lying Right arm --      Pain Score       --           Vitals:    12/21/23 1952 12/21/23 2000   BP: 146/89 146/89   Pulse: 72 73   Patient Position - Orthostatic VS: Lying          Visual Acuity      ED Medications  Medications   sodium chloride (OCEAN) 0.65 % nasal spray 2 spray (2 sprays Each Nare Given 12/21/23 2032)   fluticasone (FLONASE) 50 mcg/act nasal spray 1 spray (1 spray Each Nare Given 12/21/23 2032)       Diagnostic Studies  Results Reviewed       None                   No orders to display              Procedures  Procedures         ED Course  ED Course as of 12/21/23 2125   Thu Dec 21, 2023   1954 Triage vital signs within normal limits and stable                               SBIRT 22yo+      Flowsheet Row Most Recent Value   Initial Alcohol Screen: US AUDIT-C     1. How often do you have a drink containing alcohol? 0 Filed at: 12/21/2023 2036   2. How many drinks containing alcohol do you have on a typical day you are drinking?  0 Filed at: 12/21/2023 2036   3a. Male UNDER 65: How often do you have five or more drinks on one occasion? 0 Filed at: 12/21/2023 2036   Audit-C  Score 0 Filed at: 12/21/2023 2036   SHANE: How many times in the past year have you...    Used an illegal drug or used a prescription medication for non-medical reasons? Never Filed at: 12/21/2023 2036                      Medical Decision Making  DDx including but not limited to: Rhinitis medicamentosa, allergic rhinitis, URI, sinusitis, nasal irritation, viral illness    Patient is a pleasant 58-year-old male presenting for evaluation of 3 weeks of nasal congestion.  On interview it is with note of using Afrin spray 2-3 times daily which he initially had improvement in his symptoms, however continued using it greater than 72 hours.  He has been using it for the past 3 weeks.  He is without infectious symptoms and denies sinus pain/pressure.  No sinus tenderness on exam.  Nasal exam with enlarged pale turbinates with copious clear thin to thick secretions.  Remainder of head to toe physical exam is benign.  Suspicion highest at this time for her rhinitis medicamentosa for which we will have him promptly discontinue Afrin and start with saline flushing.  Will have him do 1 spray per nostril fluticasone once daily for 7 days.  Patient to follow-up with PCP and/or ENT if no improvement in symptoms.  He and his wife are in agreement with plan.     Problems Addressed:  Rhinitis medicamentosa: acute illness or injury    Amount and/or Complexity of Data Reviewed  Independent Historian: spouse    Risk  OTC drugs.             Disposition  Final diagnoses:   Rhinitis medicamentosa     Time reflects when diagnosis was documented in both MDM as applicable and the Disposition within this note       Time User Action Codes Description Comment    12/21/2023  8:18 PM Sammi Bland [J31.0,  T48.5X5A] Rhinitis medicamentosa           ED Disposition       ED Disposition   Discharge    Condition   Stable    Date/Time   Thu Dec 21, 2023 2016    Comment   Keegan Bryant discharge to home/self care.                   Follow-up  Information       Follow up With Specialties Details Why Contact Info    ROMULO Ramos Nurse Practitioner, Family Medicine Schedule an appointment as soon as possible for a visit in 1 week  [address] [place] [id number] [phone]              Discharge Medication List as of 12/21/2023  8:25 PM        START taking these medications    Details   sodium chloride (OCEAN) 0.65 % nasal spray 2 sprays into each nostril 4 (four) times a day as needed for congestion or rhinitis (congestion o secrecion nasal) for up to 14 days, Starting Thu 12/21/2023, Until Thu 1/4/2024 at 2359, Normal           CONTINUE these medications which have NOT CHANGED    Details   acetaminophen (TYLENOL) 325 mg tablet Take 650 mg by mouth every 6 (six) hours as needed for mild pain, Historical Med      acetaminophen-codeine (TYLENOL #3) 300-30 mg per tablet Take 1 tablet by mouth every 4 to 6 hours as needed for pain, Starting Thu 5/13/2021, Historical Med      docusate sodium (COLACE) 100 mg capsule Take 1 capsule (100 mg total) by mouth daily, Starting Thu 2/25/2021, Normal      ibuprofen (MOTRIN) 600 mg tablet Take 600 mg by mouth every 6 (six) hours as needed, Starting Thu 5/13/2021, Historical Med      lidocaine (XYLOCAINE) 5 % ointment Apply topically as needed for mild pain, Starting Wed 1/20/2021, Normal      phenylephrine-shark liver oil-mineral oil-petrolatum (PREPARATION H) 0.25-3-14-71.9 % rectal ointment Insert into the rectum 2 (two) times a day as needed for hemorrhoids, Starting Thu 2/25/2021, Normal      propranolol (INDERAL) 10 mg tablet Take 1 tablet (10 mg total) by mouth 3 (three) times a day, Starting Mon 11/2/2020, Normal                 PDMP Review       None            ED Provider  Electronically Signed by             ROMULO Fraga  12/21/23 1274

## 2024-02-12 ENCOUNTER — APPOINTMENT (EMERGENCY)
Dept: CT IMAGING | Facility: HOSPITAL | Age: 59
End: 2024-02-12

## 2024-02-12 ENCOUNTER — APPOINTMENT (EMERGENCY)
Dept: RADIOLOGY | Facility: HOSPITAL | Age: 59
End: 2024-02-12

## 2024-02-12 ENCOUNTER — HOSPITAL ENCOUNTER (EMERGENCY)
Facility: HOSPITAL | Age: 59
Discharge: HOME | End: 2024-02-12
Attending: EMERGENCY MEDICINE

## 2024-02-12 VITALS
WEIGHT: 151.9 LBS | BODY MASS INDEX: 24.52 KG/M2 | HEART RATE: 62 BPM | DIASTOLIC BLOOD PRESSURE: 85 MMHG | RESPIRATION RATE: 19 BRPM | OXYGEN SATURATION: 97 % | TEMPERATURE: 98 F | SYSTOLIC BLOOD PRESSURE: 163 MMHG

## 2024-02-12 DIAGNOSIS — M25.521 RIGHT ELBOW PAIN: ICD-10-CM

## 2024-02-12 DIAGNOSIS — S09.90XA CLOSED HEAD INJURY, INITIAL ENCOUNTER: ICD-10-CM

## 2024-02-12 DIAGNOSIS — R40.20 LOSS OF CONSCIOUSNESS (HCC): Primary | ICD-10-CM

## 2024-02-12 DIAGNOSIS — M54.2 NECK PAIN: ICD-10-CM

## 2024-02-12 DIAGNOSIS — R55 SYNCOPE AND COLLAPSE: ICD-10-CM

## 2024-02-12 DIAGNOSIS — R03.0 ELEVATED BLOOD PRESSURE READING: ICD-10-CM

## 2024-02-12 LAB
ALBUMIN SERPL BCP-MCNC: 4.4 G/DL (ref 3.5–5)
ALP SERPL-CCNC: 59 U/L (ref 34–104)
ALT SERPL W P-5'-P-CCNC: 27 U/L (ref 7–52)
ANION GAP SERPL CALCULATED.3IONS-SCNC: 6 MMOL/L
AST SERPL W P-5'-P-CCNC: 18 U/L (ref 13–39)
BASOPHILS # BLD AUTO: 0.04 THOUSANDS/ÂΜL (ref 0–0.1)
BASOPHILS NFR BLD AUTO: 1 % (ref 0–1)
BILIRUB SERPL-MCNC: 0.48 MG/DL (ref 0.2–1)
BUN SERPL-MCNC: 14 MG/DL (ref 5–25)
CALCIUM SERPL-MCNC: 9.3 MG/DL (ref 8.4–10.2)
CARDIAC TROPONIN I PNL SERPL HS: 3 NG/L
CHLORIDE SERPL-SCNC: 103 MMOL/L (ref 96–108)
CO2 SERPL-SCNC: 28 MMOL/L (ref 21–32)
CREAT SERPL-MCNC: 0.98 MG/DL (ref 0.6–1.3)
EOSINOPHIL # BLD AUTO: 0.1 THOUSAND/ÂΜL (ref 0–0.61)
EOSINOPHIL NFR BLD AUTO: 1 % (ref 0–6)
ERYTHROCYTE [DISTWIDTH] IN BLOOD BY AUTOMATED COUNT: 15.1 % (ref 11.6–15.1)
GFR SERPL CREATININE-BSD FRML MDRD: 85 ML/MIN/1.73SQ M
GLUCOSE SERPL-MCNC: 143 MG/DL (ref 65–140)
GLUCOSE SERPL-MCNC: 146 MG/DL (ref 65–140)
HCT VFR BLD AUTO: 48.6 % (ref 36.5–49.3)
HGB BLD-MCNC: 15.9 G/DL (ref 12–17)
IMM GRANULOCYTES # BLD AUTO: 0.01 THOUSAND/UL (ref 0–0.2)
IMM GRANULOCYTES NFR BLD AUTO: 0 % (ref 0–2)
LYMPHOCYTES # BLD AUTO: 1.71 THOUSANDS/ÂΜL (ref 0.6–4.47)
LYMPHOCYTES NFR BLD AUTO: 21 % (ref 14–44)
MCH RBC QN AUTO: 29.2 PG (ref 26.8–34.3)
MCHC RBC AUTO-ENTMCNC: 32.7 G/DL (ref 31.4–37.4)
MCV RBC AUTO: 89 FL (ref 82–98)
MONOCYTES # BLD AUTO: 0.52 THOUSAND/ÂΜL (ref 0.17–1.22)
MONOCYTES NFR BLD AUTO: 6 % (ref 4–12)
NEUTROPHILS # BLD AUTO: 5.91 THOUSANDS/ÂΜL (ref 1.85–7.62)
NEUTS SEG NFR BLD AUTO: 71 % (ref 43–75)
NRBC BLD AUTO-RTO: 0 /100 WBCS
PLATELET # BLD AUTO: 201 THOUSANDS/UL (ref 149–390)
PMV BLD AUTO: 9.4 FL (ref 8.9–12.7)
POTASSIUM SERPL-SCNC: 4.1 MMOL/L (ref 3.5–5.3)
PROT SERPL-MCNC: 7.3 G/DL (ref 6.4–8.4)
RBC # BLD AUTO: 5.45 MILLION/UL (ref 3.88–5.62)
SODIUM SERPL-SCNC: 137 MMOL/L (ref 135–147)
WBC # BLD AUTO: 8.29 THOUSAND/UL (ref 4.31–10.16)

## 2024-02-12 PROCEDURE — 99284 EMERGENCY DEPT VISIT MOD MDM: CPT

## 2024-02-12 PROCEDURE — 71046 X-RAY EXAM CHEST 2 VIEWS: CPT

## 2024-02-12 PROCEDURE — 73070 X-RAY EXAM OF ELBOW: CPT

## 2024-02-12 PROCEDURE — 70450 CT HEAD/BRAIN W/O DYE: CPT

## 2024-02-12 PROCEDURE — 85025 COMPLETE CBC W/AUTO DIFF WBC: CPT | Performed by: EMERGENCY MEDICINE

## 2024-02-12 PROCEDURE — 36415 COLL VENOUS BLD VENIPUNCTURE: CPT | Performed by: EMERGENCY MEDICINE

## 2024-02-12 PROCEDURE — 93005 ELECTROCARDIOGRAM TRACING: CPT

## 2024-02-12 PROCEDURE — 72125 CT NECK SPINE W/O DYE: CPT

## 2024-02-12 PROCEDURE — G1004 CDSM NDSC: HCPCS

## 2024-02-12 PROCEDURE — 80053 COMPREHEN METABOLIC PANEL: CPT | Performed by: EMERGENCY MEDICINE

## 2024-02-12 PROCEDURE — 99285 EMERGENCY DEPT VISIT HI MDM: CPT | Performed by: EMERGENCY MEDICINE

## 2024-02-12 PROCEDURE — 84484 ASSAY OF TROPONIN QUANT: CPT | Performed by: EMERGENCY MEDICINE

## 2024-02-12 PROCEDURE — 82948 REAGENT STRIP/BLOOD GLUCOSE: CPT

## 2024-02-12 RX ORDER — ACETAMINOPHEN 325 MG/1
650 TABLET ORAL ONCE
Status: COMPLETED | OUTPATIENT
Start: 2024-02-12 | End: 2024-02-12

## 2024-02-12 RX ADMIN — ACETAMINOPHEN 650 MG: 325 TABLET, FILM COATED ORAL at 19:59

## 2024-02-12 NOTE — DISCHARGE INSTRUCTIONS
Fue evaluado en el departamento de emergencia por: desmayo y dolor en el codo. Puede acceder a akhil resultados actuales y pendientes a través de MyChart de [facility]. Un radiólogo examinará por segunda vez akhil radiografías, si las tuvo, y se comunicará con usted si encuentra nuevos hallazgos.    Debe hacer un seguimiento con dey proveedor de atención primaria lo antes posible para sita nueva evaluación. Si no tiene un proveedor de atención primaria, lo he remitido a [facility]'s Primary Care. Se le contactará para programar sita kenneth. Dey número de teléfono también está incluido en esta documentación. También lo derivaron a cardiología, columna vertebral integral y cirugía ortopédica. Él también debería hacer un seguimiento con ellos.    Puede ricardo 650 mg de tylenol cada cuatro a seis horas, sin exceder los 3000 mg al día, para controlar dey malestar.    Dey análisis no reveló ninguna característica emergente en demarco momento; sin embargo, muchos procesos patológicos son dinámicos:    Por favor, regrese al departamento de emergencias si experimenta síntomas nuevos o que empeoran, fiebre, dolor en el pecho, dificultad para respirar, dificultad para respirar, mareos, dolor abdominal, náuseas/vómitos persistentes, síncope o desmayo, victoria en la orina o las heces, tos. victoria, hinchazón/dolor en las piernas, retención urinaria, incontinencia intestinal o vesical, entumecimiento entre las piernas.    Además, se midió que dey presión arterial era renan. Mc[place]aine es algo que debe discutir con dey proveedor de atención primaria y volver a verificar dentro de sita semana.    _______________________    You were evaluated in the emergency department for: passing out and elbow pain. You can access your current and pending results through [facility]. A radiologist will take a second look at your X-Rays, if you had any, and you will be contacted with any new findings.     You should follow-up with your primary care provider, as soon as  possible, for re-evaluation.  If you do not have a primary care provider, I have referred you to [facility]'s Primary Care. You will be contacted about scheduling an appointment. Their phone number is also included on this paperwork.  You have also been referred to cardiology, comprehensive spine, and orthopedic surgery.  He should follow-up with them as well.    You may take 650mg of tylenol every four to six hours, not exceeding 3,000mg daily, for the management of your discomfort.     Your workup revealed no emergent features at this time; however, many disease processes are dynamic:    Please, return to the emergency department if you experience new or worsening symptoms, fever, chest pain, shortness of breath, difficulty breathing, dizziness, abdominal pain, persistent nausea/vomiting, syncope or passing out, blood in your urine or stool, coughing up blood, leg swelling/pain, urinary retention, bowel or bladder incontinence, numbness between your legs.    Additionally, your blood pressure was measured to be high. This is something that you should discuss with your primary care provider and have re-checked within one week.

## 2024-02-12 NOTE — ED PROVIDER NOTES
History  Chief Complaint   Patient presents with    Arm Pain     Reports worsening pain around elbow area and right upper arm for the last 2 months, pain worse with movement.      Patient is a 58-year-old right-handed male, with a history significant for hypertension and Graves' disease status post ablation, who presents to the ED today due to a 2-month history of intermittent, traumatic right elbow pain.  Patient states that, over the last few months, he has had intermittent syncopal events are preceded with pallor, nausea.  There is no associated chest pain or shortness of breath or abdominal pain preceding these episodes but does report some dyspnea now that he attributes to allergies due to associated congestion and recurrence of the symptom.  Patient's wife, present in room and providing collateral history, states that she has witnessed these events and there is no postictal period when he regains alertness.  She also states that he has hit his head during these episodes.  Patient states that when he syncopized as it is usually if he is up and walking a lot and when he has not eaten or drinking for some time.  There is no associated fever, chest pain, abdominal pain, urinary symptoms, weakness, numbness, history of heart failure, family history of early cardiac or unexplained death.  Patient has taken ibuprofen as well as Tylenol up to 2 to 4 pills daily to remit his symptoms.  Movement exacerbates his elbow pain.  Patient is without other concerns at this time.    Patient is [place]ian-speaking.   [id number] used during this encounter.        Prior to Admission Medications   Prescriptions Last Dose Informant Patient Reported? Taking?   acetaminophen (TYLENOL) 325 mg tablet   Yes No   Sig: Take 650 mg by mouth every 6 (six) hours as needed for mild pain   acetaminophen-codeine (TYLENOL #3) 300-30 mg per tablet   Yes No   Sig: Take 1 tablet by mouth every 4 to 6 hours as needed for pain   docusate sodium  (COLACE) 100 mg capsule   No No   Sig: Take 1 capsule (100 mg total) by mouth daily   ibuprofen (MOTRIN) 600 mg tablet   Yes No   Sig: Take 600 mg by mouth every 6 (six) hours as needed   lidocaine (XYLOCAINE) 5 % ointment   No No   Sig: Apply topically as needed for mild pain   phenylephrine-shark liver oil-mineral oil-petrolatum (PREPARATION H) 0.25-3-14-71.9 % rectal ointment   No No   Sig: Insert into the rectum 2 (two) times a day as needed for hemorrhoids   propranolol (INDERAL) 10 mg tablet   No No   Sig: Take 1 tablet (10 mg total) by mouth 3 (three) times a day   Patient taking differently: Take 10 mg by mouth daily    sodium chloride (OCEAN) 0.65 % nasal spray   No No   Si sprays into each nostril 4 (four) times a day as needed for congestion or rhinitis (congestion o secrecion nasal) for up to 14 days      Facility-Administered Medications: None       Past Medical History:   Diagnosis Date    Disease of thyroid gland     hyperthyroid    Environmental allergies     Graves disease     Hypertension        Past Surgical History:   Procedure Laterality Date    HERNIA REPAIR      WY HEMORRHOIDECTOMY INT & XTRNL 2/> COLUMN/DENIZ N/A 2021    Procedure: HEMORRHOIDECTOMY;  Surgeon: Jeff Red MD;  Location:  MAIN OR;  Service: Colorectal       Family History   Problem Relation Age of Onset    Parkinsonism Father      I have reviewed and agree with the history as documented.    E-Cigarette/Vaping    E-Cigarette Use Never User      E-Cigarette/Vaping Substances     Social History     Tobacco Use    Smoking status: Never    Smokeless tobacco: Never   Vaping Use    Vaping status: Never Used   Substance Use Topics    Alcohol use: No    Drug use: No       Review of Systems   Constitutional:  Negative for fever.   HENT:  Negative for trouble swallowing.    Eyes:  Negative for visual disturbance.   Respiratory:  Positive for shortness of breath.    Cardiovascular:  Negative for chest pain.   Gastrointestinal:   Negative for abdominal pain.   Endocrine: Negative for polyuria.   Genitourinary:  Negative for dysuria.   Musculoskeletal:  Positive for arthralgias and neck pain. Negative for gait problem.   Skin:  Negative for rash.   Allergic/Immunologic: Negative for environmental allergies.   Neurological:  Positive for syncope. Negative for weakness and numbness.   Hematological:  Negative for adenopathy.   Psychiatric/Behavioral:  Negative for confusion.    All other systems reviewed and are negative.      Physical Exam  Physical Exam  Vitals and nursing note reviewed.   Constitutional:       General: He is not in acute distress.     Appearance: He is not ill-appearing, toxic-appearing or diaphoretic.      Comments: Patient appears comfortable during my evaluation    HENT:      Head: Normocephalic and atraumatic.      Right Ear: External ear normal.      Left Ear: External ear normal.      Nose: Nose normal. No rhinorrhea.      Mouth/Throat:      Mouth: Mucous membranes are moist.      Pharynx: Oropharynx is clear. No oropharyngeal exudate or posterior oropharyngeal erythema.      Comments: Uvula midline. No oropharyngeal or submandibular mass/swelling  Eyes:      General: No scleral icterus.        Right eye: No discharge.         Left eye: No discharge.      Conjunctiva/sclera: Conjunctivae normal.      Pupils: Pupils are equal, round, and reactive to light.   Neck:      Comments: Patient is spontaneously rotating their neck to the left and right during the history and physical exam interaction without difficulty or apparent discomfort    Cardiovascular:      Rate and Rhythm: Normal rate and regular rhythm.      Pulses: Normal pulses.      Heart sounds: Normal heart sounds. No murmur heard.     No friction rub. No gallop.      Comments: 2+ Radial bilaterally  Pulmonary:      Effort: Pulmonary effort is normal. No respiratory distress.      Breath sounds: Normal breath sounds. No stridor. No wheezing, rhonchi or rales.    Abdominal:      General: Abdomen is flat. There is no distension.      Palpations: Abdomen is soft.      Tenderness: There is no abdominal tenderness. There is no right CVA tenderness, left CVA tenderness, guarding or rebound.   Musculoskeletal:         General: Tenderness present. No deformity.      Cervical back: Neck supple. No tenderness. No muscular tenderness.      Right lower leg: No edema.      Left lower leg: No edema.      Comments: Tenderness to palpation of the right elbow.  No limitation of range of motion, no swelling, no abnormal warmth, no pain or proportion to exam    No tenderness to palpation of the bilateral shoulders, elbows, arms, thighs, knees, legs. No chest wall or pelvic tenderness to palpation     No midline T, L spine tenderness to palpation     Lymphadenopathy:      Cervical: No cervical adenopathy.   Skin:     General: Skin is warm and dry.      Capillary Refill: Capillary refill takes less than 2 seconds.   Neurological:      Mental Status: He is alert.      Comments: Cranial nerves 2-12 intact.  5/5 strength in all four extremities including finger extension against resistance.  Sensation to light touch subjectively intact/equal in all four extremities and the face.  Patient able to ambulate without difficulty.    Patient is speaking clearly in complete sentences.  Patient is answering appropriately and able to follow commands.     Intact median, radial, ulnar motor and sensory function bilaterally   Psychiatric:         Mood and Affect: Mood normal.         Behavior: Behavior normal.         Vital Signs  ED Triage Vitals [02/12/24 1608]   Temperature Pulse Respirations Blood Pressure SpO2   98 °F (36.7 °C) 62 19 163/85 97 %      Temp Source Heart Rate Source Patient Position - Orthostatic VS BP Location FiO2 (%)   Oral Monitor Sitting Left arm --      Pain Score       --           Vitals:    02/12/24 1608   BP: 163/85   Pulse: 62   Patient Position - Orthostatic VS: Sitting          Visual Acuity      ED Medications  Medications   acetaminophen (TYLENOL) tablet 650 mg (has no administration in time range)       Diagnostic Studies  Results Reviewed       Procedure Component Value Units Date/Time    HS Troponin 0hr (reflex protocol) [[id number]]  (Normal) Collected: 02/12/24 1833    Lab Status: Final result Specimen: Blood from Arm, Left Updated: 02/12/24 1913     hs TnI 0hr 3 ng/L     Comprehensive metabolic panel [[id number]]  (Abnormal) Collected: 02/12/24 1833    Lab Status: Final result Specimen: Blood from Arm, Left Updated: 02/12/24 1903     Sodium 137 mmol/L      Potassium 4.1 mmol/L      Chloride 103 mmol/L      CO2 28 mmol/L      ANION GAP 6 mmol/L      BUN 14 mg/dL      Creatinine 0.98 mg/dL      Glucose 146 mg/dL      Calcium 9.3 mg/dL      AST 18 U/L      ALT 27 U/L      Alkaline Phosphatase 59 U/L      Total Protein 7.3 g/dL      Albumin 4.4 g/dL      Total Bilirubin 0.48 mg/dL      eGFR 85 ml/min/1.73sq m     Narrative:      National Kidney Disease Foundation guidelines for Chronic Kidney Disease (CKD):     Stage 1 with normal or high GFR (GFR > 90 mL/min/1.73 square meters)    Stage 2 Mild CKD (GFR = 60-89 mL/min/1.73 square meters)    Stage 3A Moderate CKD (GFR = 45-59 mL/min/1.73 square meters)    Stage 3B Moderate CKD (GFR = 30-44 mL/min/1.73 square meters)    Stage 4 Severe CKD (GFR = 15-29 mL/min/1.73 square meters)    Stage 5 End Stage CKD (GFR <15 mL/min/1.73 square meters)  Note: GFR calculation is accurate only with a steady state creatinine    CBC and differential [[id number]] Collected: 02/12/24 1833    Lab Status: Final result Specimen: Blood from Arm, Left Updated: 02/12/24 1840     WBC 8.29 Thousand/uL      RBC 5.45 Million/uL      Hemoglobin 15.9 g/dL      Hematocrit 48.6 %      MCV 89 fL      MCH 29.2 pg      MCHC 32.7 g/dL      RDW 15.1 %      MPV 9.4 fL      Platelets 201 Thousands/uL      nRBC 0 /100 WBCs      Neutrophils Relative 71 %      Immat GRANS % 0  %      Lymphocytes Relative 21 %      Monocytes Relative 6 %      Eosinophils Relative 1 %      Basophils Relative 1 %      Neutrophils Absolute 5.91 Thousands/µL      Immature Grans Absolute 0.01 Thousand/uL      Lymphocytes Absolute 1.71 Thousands/µL      Monocytes Absolute 0.52 Thousand/µL      Eosinophils Absolute 0.10 Thousand/µL      Basophils Absolute 0.04 Thousands/µL     Fingerstick Glucose (POCT) [[id number]]  (Abnormal) Collected: 02/12/24 1802    Lab Status: Final result Updated: 02/12/24 1803     POC Glucose 143 mg/dl                    XR chest 2 views   ED Interpretation by Luis E Yeh MD (02/12 1857)   Per my independent interpretation: No acute cardiopulmonary process, no cardiomegaly      XR elbow 2 views RIGHT   ED Interpretation by Luis E Yeh MD (02/12 1857)   Per my independent interpretation: No acute osseous abnormality      CT spine cervical without contrast    (Results Pending)   CT head without contrast    (Results Pending)              Procedures  Procedures         ED Course  ED Course as of 02/12/24 1940 Mon Feb 12, 2024   1837 ECG per my independent interpretation: Normal rate, regular rhythm, no ectopy, normal axis, no ST elevations or depressions     1856 Hemoglobin: 15.9  WNL   1904 Sodium: 137  WNL   1904 Potassium: 4.1  WNL   1915 hs TnI 0hr: 3  WNL -patient has had dyspnea since 10 AM today, no need for delta.  Patient states he has been feeling short of breath for 1 year overall.   1921 Patient is low to very low risk of 30-day serious adverse event per the [place] syncope risk score.   1938 Patient signed out prior to final disposition                                             Medical Decision Making  Patient is a 58-year-old right-handed male, with a history significant for hypertension and Graves' disease status post ablation, who presents to the ED today due to a 2-month history of intermittent, traumatic right elbow pain.  Patient states that, over  the last few months, he has had intermittent syncopal events are preceded with pallor, nausea.  There is no associated chest pain or shortness of breath or abdominal pain preceding these episodes but does report some dyspnea now that he attributes to allergies due to associated congestion and recurrence of the symptom.  Patient's wife, present in room and providing collateral history, states that she has witnessed these events and there is no postictal period when he regains alertness.  She also states that he has hit his head during these episodes.  Patient states that when he syncopized as it is usually if he is up and walking a lot and when he has not eaten or drinking for some time.  There is no associated fever, chest pain, abdominal pain, urinary symptoms, weakness, numbness, history of heart failure, history of A-fib, family history of early cardiac or unexplained death.  Patient has taken ibuprofen as well as Tylenol up to 2 to 4 pills daily to remit his symptoms.  Movement exacerbates his elbow pain.  Patient is currently afebrile and hemodynamically stable.  His physical exam is notable for tenderness to palpation of the right elbow and midline C-spine: No pain out of proportion to exam, no limitation of range of motion, no focal neurodeficit, no lower extremity edema, clear heart and lungs, soft nontender abdomen, good pulses and capillary refill.  This presentation is concerning for: Sprain, strain, contusion, fracture, vasovagal syncope.  I also considered ACS, anemia, electrolyte abnormality, arrhythmia, cervical vertebral body injury, ICH.  No stigmata of heart failure on physical exam.  No reason to suspect septic arthritis, seizure at this time based on history physical exam.  Will investigate with cardiac workup, CT head and C-spine.  Will manage with Tylenol and further based on workup.  Patient states that he does follow with a PCP.    Amount and/or Complexity of Data Reviewed  Labs: ordered.  Decision-making details documented in ED Course.  Radiology: ordered and independent interpretation performed.    Risk  OTC drugs.             Disposition  Final diagnoses:   Right elbow pain   Syncope and collapse   Closed head injury, initial encounter   Neck pain   Elevated blood pressure reading   Loss of consciousness (HCC)     Time reflects when diagnosis was documented in both MDM as applicable and the Disposition within this note       Time User Action Codes Description Comment    2/12/2024  6:32 PM Legare, Christopher A Add [M25.521] Right elbow pain     2/12/2024  6:32 PM Legare, Christopher A Add [R55] Syncope and collapse     2/12/2024  6:32 PM Legare, Christopher A Add [S09.90XA] Closed head injury, initial encounter     2/12/2024  6:32 PM Legare, Christopher A Add [M54.2] Neck pain     2/12/2024  6:34 PM Legare, Christopher A Add [R03.0] Elevated blood pressure reading     2/12/2024  6:35 PM Legare, Christopher A Add [R40.20] Loss of consciousness (HCC)     2/12/2024  6:35 PM Legare, Christopher A Modify [M25.521] Right elbow pain     2/12/2024  6:35 PM Legare, Christopher A Modify [R40.20] Loss of consciousness (HCC)           ED Disposition       None          Follow-up Information       Follow up With Specialties Details Why Contact Info Additional Information    ROMULO Ramos Nurse Practitioner, Family Medicine Schedule an appointment as soon as possible for a visit   [address] [facility] [address] [phone]       [facility] Schedule an appointment as soon as possible for a visit   [address] [facility] [address] [phone] [facility] [place], [address] [place], [address] [phone]    [facility] [place] [facility] Schedule an appointment as soon as possible for a visit   [address] [facility] [id number] [phone]  [facility] [place], [address] [place] [address], [address] [phone]  Use Entrance A             Patient's Medications   Discharge Prescriptions    No medications on file           PDMP Review       None            ED Provider  Electronically Signed by             Luis E Yeh MD  02/12/24 3343

## 2024-02-13 ENCOUNTER — TELEPHONE (OUTPATIENT)
Dept: PHYSICAL THERAPY | Facility: OTHER | Age: 59
End: 2024-02-13

## 2024-02-13 LAB
ATRIAL RATE: 55 BPM
P AXIS: 63 DEGREES
PR INTERVAL: 156 MS
QRS AXIS: 38 DEGREES
QRSD INTERVAL: 94 MS
QT INTERVAL: 420 MS
QTC INTERVAL: 401 MS
T WAVE AXIS: 44 DEGREES
VENTRICULAR RATE: 55 BPM

## 2024-02-13 NOTE — ED CARE HANDOFF
"Emergency Department Sign Out Note        Sign out and transfer of care from Dr Yeh. See Separate Emergency Department note.     The patient, Keegan Bryant, was evaluated by the previous provider for elbow pain.    Workup Completed:  EKG, labs, XR    ED Course / Workup Pending (followup):  1900: CT head and C-spine pending at time of signout.  If negative for acute traumatic injury, patient stable for discharge and outpatient follow-up.    Imaging negative for acute traumatic injury.  I spoke with patient using language line.  He understands results and has no additional concerns or questions.  I discussed referrals to orthopedic surgery and cardiology.  He agrees to follow-up as directed and will return to ED if he has recurrent syncope, worsening pain or other concerns.    Portions of the above record have been created with voice recognition software.  Occasional wrong word or \"sound alike\" substitutions may have occurred due to the inherent limitations of voice recognition software.  Read the chart carefully and recognize, using context, where substitutions may have occurred.                                       Procedures  Medical Decision Making  Amount and/or Complexity of Data Reviewed  Labs: ordered.  Radiology: ordered and independent interpretation performed.    Risk  OTC drugs.            Disposition  Final diagnoses:   Right elbow pain   Syncope and collapse   Closed head injury, initial encounter   Neck pain   Elevated blood pressure reading   Loss of consciousness (HCC)     Time reflects when diagnosis was documented in both MDM as applicable and the Disposition within this note       Time User Action Codes Description Comment    2/12/2024  6:32 PM Luis E Yeh Add [M25.521] Right elbow pain     2/12/2024  6:32 PM Luis E Yeh Add [R55] Syncope and collapse     2/12/2024  6:32 PM Luis E Yeh Add [S09.90XA] Closed head injury, initial encounter     2/12/2024  6:32 PM " Luis E Yeh A Add [M54.2] Neck pain     2/12/2024  6:34 PM Legabhay, Derreker A Add [R03.0] Elevated blood pressure reading     2/12/2024  6:35 PM Legabhay, Derreker A Add [R40.20] Loss of consciousness (HCC)     2/12/2024  6:35 PM Legabhay, Derreker A Modify [M25.521] Right elbow pain     2/12/2024  6:35 PM Legabhay, Benopher A Modify [R40.20] Loss of consciousness (HCC)           ED Disposition       None          Follow-up Information       Follow up With Specialties Details Why Contact Info Additional Information    ROMULO Ramos Nurse Practitioner, Family Medicine Schedule an appointment as soon as possible for a visit   [address] [facility] [address] [phone]       [facility] Schedule an appointment as soon as possible for a visit   [address] [facility] [place] [address] [phone] [facility], [address] [place], [place], [address] [phone]    [facility] [place] [facility] Schedule an appointment as soon as possible for a visit   [address] [facility] [address] [phone] [facility] [place], [address], [address] [phone]  Use Entrance A           Patient's Medications   Discharge Prescriptions    No medications on file            ED Provider  Electronically Signed by     Nils Clement DO  02/13/24 0020

## 2024-02-23 NOTE — TELEPHONE ENCOUNTER
Call placed to the patient per [facility] referral.    V/M left in Hebrew for patient to call back. Phone number and hours of business provided.    This is the 1st attempt to reach the patient. Will defer referral per protocol.  
FLACA Veras #[id number]    Call placed to the patient per [facility] referral.    Voice message left for patient to call back. Phone number and hours of business provided.     This the 2nd attempt to reach the patient.   Will defer per protocol.    
Mir # [id number]    Call placed to the patient per [facility] referral.    Voice message left for patient to call back. Phone number and hours of business provided.     This is the 3rd attempt to reach the patient.   Referral closed.    
Never smoker

## 2024-03-26 ENCOUNTER — OFFICE VISIT (OUTPATIENT)
Dept: CARDIOLOGY CLINIC | Facility: CLINIC | Age: 59
End: 2024-03-26

## 2024-03-26 VITALS
WEIGHT: 154 LBS | SYSTOLIC BLOOD PRESSURE: 126 MMHG | OXYGEN SATURATION: 97 % | DIASTOLIC BLOOD PRESSURE: 80 MMHG | HEART RATE: 69 BPM | BODY MASS INDEX: 24.86 KG/M2

## 2024-03-26 DIAGNOSIS — R55 SYNCOPE AND COLLAPSE: ICD-10-CM

## 2024-03-26 PROCEDURE — 99244 OFF/OP CNSLTJ NEW/EST MOD 40: CPT | Performed by: INTERNAL MEDICINE

## 2024-03-26 RX ORDER — ATENOLOL 25 MG/1
1 TABLET ORAL DAILY
COMMUNITY
Start: 2024-01-29

## 2024-03-26 RX ORDER — LEVOTHYROXINE SODIUM 0.03 MG/1
25 TABLET ORAL DAILY
COMMUNITY
Start: 2023-10-13 | End: 2024-07-09

## 2024-03-26 NOTE — PROGRESS NOTES
Outpatient Consultation - General Cardiology   Keegan Bryant 58 y.o. male   MRN: [id number]  Encounter: [id number]            Physician Requesting Consult: Consults   PCP: ROMULO Ramos      Reason for Consult / Principal Problem: Syncope      Assessment & Plan      Syncope.  Secondary to medication versus underlying bradyarrhythmia.  Patient does not endorse any other symptoms of arrhythmias or heart failure.  Does not have any complaints of angina.    Sinus bradycardia.  Noted on EKG during hospitalization.  Patient was also in sinus bradycardia during my examination.  Patient is not symptomatic at this time and does not have any lightheadedness or dizziness.  Heart rate during this office visit is between 55-69.    Hypertension  Graves' disease s/p ablation  Hypothyroidism    Plan:    Will obtain 2 weeks Zio patch  Discussed that patient should follow-up with endocrinology to discuss if atenolol needs to be reduced due to sinus bradycardia  Will have patient follow-up in 6 months          1. Syncope and collapse    - Ambulatory Referral to Cardiology           History of Present Illness   I had the pleasure of seeing Keegan Bryant who presents to establish care with [facility].  He was referred to cardiology for multiple episodes of syncope.    Has had 3 episodes within the last year.  His last episode of syncope was 6 months ago.  States that he feels a prodrome of nausea, lightheadedness, diaphoresis prior to syncope.  Patient states brief episode of LOC, no postictal state and no seizure-like activity noticed by his spouse.    Patient reports experiencing a feeling of nausea and dizziness before the onset of the syncope. The feeling intensifies, leading to a loss of consciousness. He describes the feeling as his body collapsing and a sensation of impending death. After a few minutes, he regains consciousness and feels fine. These episodes are more likely to occur when Mr. Bryant  hasn't eaten, especially during his work hours when he's engaged in extensive physical activity.    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 He usually starts his day at 4:30 am and works mostly on Fridays, Saturdays, and Sundays.    Family History:  No known family history of heart disease.    Past Medical History:   Diagnosis Date   • Disease of thyroid gland     hyperthyroid   • Environmental allergies    • Graves disease    • Hypertension      Social History     Socioeconomic History   • Marital status: Single     Spouse name: Not on file   • Number of children: Not on file   • Years of education: Not on file   • Highest education level: Not on file   Occupational History   • Not on file   Tobacco Use   • Smoking status: Never   • Smokeless tobacco: Never   Vaping Use   • Vaping status: Never Used   Substance and Sexual Activity   • Alcohol use: No   • Drug use: No   • Sexual activity: Not on file   Other Topics Concern   • Not on file   Social History Narrative   • Not on file     Social Determinants of Health     Financial Resource Strain: Not on file   Food Insecurity: Not on file   Transportation Needs: Not on file   Physical Activity: Not on file   Stress: Not on file   Social Connections: Not on file   Intimate Partner Violence: Not on file   Housing Stability: Not on file      Family History   Problem Relation Age of Onset   • Parkinsonism Father      Past Surgical History:   Procedure Laterality Date   • HERNIA REPAIR     • [place] HEMORRHOIDECTOMY INT & XTRNL 2/> COLUMN/DENIZ N/A 6/11/2021    Procedure: HEMORRHOIDECTOMY;  Surgeon: Jeff Red MD;  Location:  MAIN OR;  Service: Colorectal       Current Outpatient Medications:   •  acetaminophen (TYLENOL) 325 mg tablet, Take 650 mg by mouth every 6 (six) hours as needed for mild pain, Disp: , Rfl:   •  atenolol (TENORMIN) 25 mg tablet, Take 1 tablet by mouth daily, Disp: , Rfl:   •  levothyroxine 25 mcg  tablet, Take 25 mcg by mouth daily, Disp: , Rfl:   •  acetaminophen-codeine (TYLENOL #3) 300-30 mg per tablet, Take 1 tablet by mouth every 4 to 6 hours as needed for pain (Patient not taking: Reported on 3/26/2024), Disp: , Rfl:   •  docusate sodium (COLACE) 100 mg capsule, Take 1 capsule (100 mg total) by mouth daily (Patient not taking: Reported on 3/26/2024), Disp: 30 capsule, Rfl: 0  •  ibuprofen (MOTRIN) 600 mg tablet, Take 600 mg by mouth every 6 (six) hours as needed, Disp: , Rfl:   •  lidocaine (XYLOCAINE) 5 % ointment, Apply topically as needed for mild pain (Patient not taking: Reported on 3/26/2024), Disp: 35.44 g, Rfl: 0  •  phenylephrine-shark liver oil-mineral oil-petrolatum (PREPARATION H) 0.25-3-14-71.9 % rectal ointment, Insert into the rectum 2 (two) times a day as needed for hemorrhoids (Patient not taking: Reported on 3/26/2024), Disp: 30 g, Rfl: 0  •  sodium chloride (OCEAN) 0.65 % nasal spray, 2 sprays into each nostril 4 (four) times a day as needed for congestion or rhinitis (congestion o secrecion nasal) for up to 14 days, Disp: 104 mL, Rfl: 2  No Known Allergies      Review of Systems  Review of system was conducted and was negative except for as stated in the HPI.        Objective   Vitals: Blood pressure 126/80, pulse 69, weight 69.9 kg (154 lb), SpO2 97%.          Physical Exam  Vitals reviewed.   Constitutional:       Appearance: Normal appearance.   HENT:      Head: Normocephalic and atraumatic.   Neck:      Vascular: No JVD.   Cardiovascular:      Rate and Rhythm: Normal rate and regular rhythm.      Pulses: Normal pulses.      Heart sounds: No murmur heard.     No gallop.   Pulmonary:      Effort: Pulmonary effort is normal. No respiratory distress.      Breath sounds: No stridor. No wheezing.   Chest:      Chest wall: No tenderness.   Musculoskeletal:      Right lower leg: No edema.      Left lower leg: No edema.   Skin:     General: Skin is warm and dry.   Neurological:       "General: No focal deficit present.      Mental Status: He is alert and oriented to person, place, and time.   Psychiatric:         Mood and Affect: Mood normal.         Behavior: Behavior normal.             Lab Results: I have personally reviewed pertinent lab results.    Lab Results   Component Value Date    HSTNI0 3 02/12/2024     No results found for: \"NTBNP\"  No results found for: \"CHOL\", \"TRIG\", \"HDL\", \"LDLCALC\", \"LDLDIRECT\"  Lab Results   Component Value Date     10/20/2015    K 4.1 02/12/2024    CO2 28 02/12/2024     02/12/2024    BUN 14 02/12/2024    CREATININE 0.98 02/12/2024    ALT 27 02/12/2024    AST 18 02/12/2024    TSH 2.67 10/12/2023     Lab Results   Component Value Date    WBC 8.29 02/12/2024    HGB 15.9 02/12/2024    HCT 48.6 02/12/2024    MCV 89 02/12/2024     02/12/2024     No results found for: \"INR\"      Imaging: I have personally reviewed pertinent reports.      I have spent a total time of 50 minutes on 03/26/24 in caring for this patient including Risks and benefits of tx options, Instructions for management, Patient and family education, Importance of tx compliance, Impressions, Reviewing / ordering tests, medicine, procedures  , and Obtaining or reviewing history  .                  Mak Ojeda MD  Cardiology Fellow   PGY-4      ==========================================================================================    Epic/ Allscripts/Care Everywhere records reviewed: Yes    ** Please Note: Fluency DirectDictation voice to text software may have been used in the creation of this document. **    "

## 2024-03-29 ENCOUNTER — TELEPHONE (OUTPATIENT)
Dept: CARDIOLOGY CLINIC | Facility: CLINIC | Age: 59
End: 2024-03-29

## 2024-03-29 NOTE — TELEPHONE ENCOUNTER
LM for patient regarding Insurance information. Zio patch has been ordered for patient in office and needs a prior authorization before ordering. Pt needs to update insurance information with office or let office know if he is self-pay.     KORINA Donohue

## 2025-01-29 ENCOUNTER — APPOINTMENT (OUTPATIENT)
Dept: LAB | Facility: CLINIC | Age: 60
End: 2025-01-29

## 2025-01-29 DIAGNOSIS — E89.0 POSTABLATIVE HYPOTHYROIDISM: ICD-10-CM

## 2025-01-29 DIAGNOSIS — R73.9 HYPERGLYCEMIA: ICD-10-CM

## 2025-01-29 LAB
ANION GAP SERPL CALCULATED.3IONS-SCNC: 8 MMOL/L (ref 4–13)
BUN SERPL-MCNC: 12 MG/DL (ref 5–25)
CALCIUM SERPL-MCNC: 9.3 MG/DL (ref 8.4–10.2)
CHLORIDE SERPL-SCNC: 104 MMOL/L (ref 96–108)
CO2 SERPL-SCNC: 30 MMOL/L (ref 21–32)
CREAT SERPL-MCNC: 0.83 MG/DL (ref 0.6–1.3)
EST. AVERAGE GLUCOSE BLD GHB EST-MCNC: 123 MG/DL
GFR SERPL CREATININE-BSD FRML MDRD: 96 ML/MIN/1.73SQ M
GLUCOSE P FAST SERPL-MCNC: 84 MG/DL (ref 65–99)
HBA1C MFR BLD: 5.9 %
POTASSIUM SERPL-SCNC: 4.6 MMOL/L (ref 3.5–5.3)
SODIUM SERPL-SCNC: 142 MMOL/L (ref 135–147)
T3 SERPL-MCNC: 1.3 NG/ML (ref 0.9–1.8)
T4 FREE SERPL-MCNC: 0.46 NG/DL (ref 0.61–1.12)
TSH SERPL DL<=0.05 MIU/L-ACNC: 5.62 UIU/ML (ref 0.45–4.5)

## 2025-01-29 PROCEDURE — 83036 HEMOGLOBIN GLYCOSYLATED A1C: CPT

## 2025-01-29 PROCEDURE — 84480 ASSAY TRIIODOTHYRONINE (T3): CPT

## 2025-01-29 PROCEDURE — 80048 BASIC METABOLIC PNL TOTAL CA: CPT

## 2025-01-29 PROCEDURE — 84443 ASSAY THYROID STIM HORMONE: CPT

## 2025-01-29 PROCEDURE — 36415 COLL VENOUS BLD VENIPUNCTURE: CPT

## 2025-01-29 PROCEDURE — 84439 ASSAY OF FREE THYROXINE: CPT

## 2025-05-01 ENCOUNTER — OFFICE VISIT (OUTPATIENT)
Dept: INTERNAL MEDICINE CLINIC | Facility: CLINIC | Age: 60
End: 2025-05-01

## 2025-05-01 VITALS
SYSTOLIC BLOOD PRESSURE: 151 MMHG | TEMPERATURE: 97.9 F | HEART RATE: 54 BPM | WEIGHT: 142.6 LBS | OXYGEN SATURATION: 97 % | HEIGHT: 63 IN | BODY MASS INDEX: 25.27 KG/M2 | DIASTOLIC BLOOD PRESSURE: 86 MMHG

## 2025-05-01 DIAGNOSIS — I10 ESSENTIAL HYPERTENSION: ICD-10-CM

## 2025-05-01 DIAGNOSIS — Z11.59 NEED FOR HEPATITIS C SCREENING TEST: ICD-10-CM

## 2025-05-01 DIAGNOSIS — Z86.59 HISTORY OF DEPRESSIVE SYMPTOMS: ICD-10-CM

## 2025-05-01 DIAGNOSIS — Z11.4 SCREENING FOR HIV (HUMAN IMMUNODEFICIENCY VIRUS): ICD-10-CM

## 2025-05-01 DIAGNOSIS — Z76.89 ENCOUNTER TO ESTABLISH CARE: Primary | ICD-10-CM

## 2025-05-01 DIAGNOSIS — J30.2 SEASONAL ALLERGIES: ICD-10-CM

## 2025-05-01 DIAGNOSIS — Z13.220 SCREENING FOR HYPERLIPIDEMIA: ICD-10-CM

## 2025-05-01 DIAGNOSIS — Z13.21 ENCOUNTER FOR VITAMIN DEFICIENCY SCREENING: ICD-10-CM

## 2025-05-01 DIAGNOSIS — E89.0 POSTABLATIVE HYPOTHYROIDISM: ICD-10-CM

## 2025-05-01 DIAGNOSIS — E05.00 GRAVES DISEASE: ICD-10-CM

## 2025-05-01 RX ORDER — LEVOTHYROXINE SODIUM 50 UG/1
50 TABLET ORAL DAILY
COMMUNITY

## 2025-05-01 RX ORDER — CETIRIZINE HYDROCHLORIDE 10 MG/1
10 TABLET ORAL DAILY
Qty: 90 TABLET | Refills: 0 | Status: SHIPPED | OUTPATIENT
Start: 2025-05-01

## 2025-05-01 NOTE — PROGRESS NOTES
Name: Keegan Bryant      : 1965      MRN: [id number]  Encounter Provider: Harjit Bartholomew DO  Encounter Date: 2025   Encounter department: [facility] [place]  :  Assessment & Plan  Encounter to establish care    Orders:    CBC and differential; Future    History of depressive symptoms    Depressive symptoms as described in HPI.  The onset of symptoms are temporally related to the onset of his thyroid problems.  He is bradycardic in the office today.  I am concerned that atenolol use or hypothyroidism is contributing to the depressive symptoms.  I extensively discussed an atenolol taper with him over the next 2 weeks.  He will also obtain a repeat serum TSH.  He will return to the office in 2 weeks at which time we will reevaluate his depressive symptoms and response to the discontinuation of atenolol.       Essential hypertension    Currently maintained on 25 mg of atenolol daily.  Will begin tapering this.  He is hypertensive in office today with a BP of 151/86.  He states that he reports his blood pressure at home and that it is usually lower than this.  He does not have any numeric blood pressures at the time to report to me.  I provided him with a sheet to record his daily blood pressures and instructed him to record 1 value for the morning and 1 value for the evening every day.  At his next visit in 2 weeks we will evaluate him for hypertension and consider initiation of antihypertensive.       Seasonal allergies    Itchiness in throat, eyes, nose, and congestion in the evenings. He uses Flonase without relief of symptoms.  Will treat with cetirizine and evaluate response in 2 weeks.    Orders:    cetirizine (ZyrTEC) 10 mg tablet; Take 1 tablet (10 mg total) by mouth daily    Postablative hypothyroidism    Follows with endocrinology. Maintained on 50 mcg levothyroxine.     Orders:    TSH, 3rd generation with Free T4 reflex; Future    Graves disease    Orders:    TSH, 3rd  generation with Free T4 reflex; Future    Encounter for vitamin deficiency screening    Orders:    Vitamin D 25 hydroxy; Future    Need for hepatitis C screening test    Orders:    Hepatitis C Antibody; Future    Screening for HIV (human immunodeficiency virus)    Orders:    HIV 1/2 AG/AB w Reflex [facility] for 2 yr old and above; Future    Screening for hyperlipidemia    Orders:    Lipid Panel with Direct LDL reflex; Future          Depression Screening and Follow-up Plan: Patient was screened for depression during today's encounter. They screened negative with a PHQ-2 score of 0.        History of Present Illness   Keegan Bryant is a 59 y.o. male with who presents to [facility] care. They were most recently seen by [facility] endocrinology on January 28, 2025. At that time he was being managed for post radioiodine hypothyroidism and treated with atenolol 25 mg and levothyroxine 25 mcg that was increased to 50 mcg in setting of hypothyroid labs.  He was also seen by cardiology last month for 3 episodes of syncope and bradycardia for which she was given a 2-week Zio patch.  He was last seen by [facility] practice [facility] in late 2021. Their PMH/PSH is notable for syncope, sinus bradycardia, HTN, Graves' disease s/p ablation (around 2016), hypothyroidism.    He states that he has been having depressive symptoms since having problems with his thyroid. He states that he feels unexplained sadness two to three times per week. He endorses difficulty falling asleep, stating it takes him two hours to fall asleep. He sometimes wakes at night and has difficulty returning to sleep. He endorses a lack of interest in things he used to enjoy, such as music and talking to people. No guilt. There is a decrease in energy, stating he feels weak. He has some difficulty concentrating. His appetite is decreased. No SI or HI.     Spoke to patient with assistance of  #[id number].    Social History:  Patient lives in a house  "with his wife. Currently works in cleaning. Diet is high in rice, chicken, and beans. They regularly consume coffee, approximately 2 cups per day. They do not get formal exercise. They have no history of smoking. They do not consume alcohol. No history of recreational drug use.      Review of Systems   Constitutional:  Positive for appetite change. Negative for chills and fever.   Eyes:  Positive for discharge and itching. Negative for pain.   Respiratory:  Negative for chest tightness and shortness of breath.    Cardiovascular:  Negative for chest pain.   Gastrointestinal:  Negative for abdominal pain, constipation, diarrhea, nausea and vomiting.   Musculoskeletal:  Positive for neck pain (occasional). Negative for back pain and myalgias.   Neurological:  Negative for dizziness, light-headedness and headaches.       Objective   /86 (BP Location: Right arm)   Pulse (!) 54   Temp 97.9 °F (36.6 °C) (Temporal)   Ht 5' 3\" (1.6 m)   Wt 64.7 kg (142 lb 9.6 oz)   SpO2 97%   BMI 25.26 kg/m²      Physical Exam  Vitals reviewed.   Constitutional:       General: He is not in acute distress.     Appearance: Normal appearance. He is not ill-appearing or diaphoretic.   HENT:      Head: Normocephalic.      Right Ear: External ear normal.      Left Ear: External ear normal.      Nose: Rhinorrhea present. No congestion.      Mouth/Throat:      Mouth: Mucous membranes are moist.   Eyes:      General: No scleral icterus.     Extraocular Movements: Extraocular movements intact.   Cardiovascular:      Rate and Rhythm: Regular rhythm. Bradycardia present.      Pulses: Normal pulses.      Heart sounds: No murmur heard.     No friction rub. No gallop.   Pulmonary:      Effort: Pulmonary effort is normal. No respiratory distress.      Breath sounds: Normal breath sounds. No stridor. No wheezing or rales.   Abdominal:      General: Abdomen is flat. There is no distension.      Palpations: Abdomen is soft.      Tenderness: There " is no abdominal tenderness.   Musculoskeletal:      Right lower leg: No edema.      Left lower leg: No edema.   Skin:     General: Skin is warm and dry.      Coloration: Skin is not jaundiced or pale.      Findings: No bruising.   Neurological:      General: No focal deficit present.      Mental Status: He is alert.   Psychiatric:         Mood and Affect: Mood normal.         Behavior: Behavior normal.

## 2025-05-01 NOTE — ASSESSMENT & PLAN NOTE
Follows with endocrinology. Maintained on 50 mcg levothyroxine.     Orders:    TSH, 3rd generation with Free T4 reflex; Future

## 2025-05-01 NOTE — ASSESSMENT & PLAN NOTE
Currently maintained on 25 mg of atenolol daily.  Will begin tapering this.  He is hypertensive in office today with a BP of 151/86.  He states that he reports his blood pressure at home and that it is usually lower than this.  He does not have any numeric blood pressures at the time to report to me.  I provided him with a sheet to record his daily blood pressures and instructed him to record 1 value for the morning and 1 value for the evening every day.  At his next visit in 2 weeks we will evaluate him for hypertension and consider initiation of antihypertensive.

## 2025-05-15 ENCOUNTER — TELEPHONE (OUTPATIENT)
Dept: INTERNAL MEDICINE CLINIC | Facility: CLINIC | Age: 60
End: 2025-05-15

## 2025-05-15 NOTE — TELEPHONE ENCOUNTER
No Show#1-I spoke to wife about his appt she states she didn't know and would it be possible to reschedule.    I assisted her with rescheduling/NS letter sent

## 2025-05-29 ENCOUNTER — OFFICE VISIT (OUTPATIENT)
Dept: INTERNAL MEDICINE CLINIC | Facility: CLINIC | Age: 60
End: 2025-05-29

## 2025-05-29 VITALS
SYSTOLIC BLOOD PRESSURE: 124 MMHG | HEART RATE: 59 BPM | WEIGHT: 143 LBS | HEIGHT: 63 IN | DIASTOLIC BLOOD PRESSURE: 74 MMHG | TEMPERATURE: 98.3 F | BODY MASS INDEX: 25.34 KG/M2

## 2025-05-29 DIAGNOSIS — E89.0 POSTABLATIVE HYPOTHYROIDISM: ICD-10-CM

## 2025-05-29 DIAGNOSIS — I10 ESSENTIAL HYPERTENSION: Primary | ICD-10-CM

## 2025-05-29 DIAGNOSIS — F32.A DEPRESSION, UNSPECIFIED DEPRESSION TYPE: ICD-10-CM

## 2025-05-29 DIAGNOSIS — E05.00 GRAVES DISEASE: ICD-10-CM

## 2025-05-29 NOTE — PROGRESS NOTES
Name: Keegan Bryant      : 1965      MRN: [id number]  Encounter Provider: Galina Murphy MD  Encounter Date: 2025   Encounter department: [facility]    Assessment & Plan  Essential hypertension  Patient was last here on 2025 , he was experiencing depressive sx for some time , provider felt atenolol may be contributing to these sx , he was also bradycardic he was asked to taper atenolol dose to 25 mg q other day HR 59 last was 54   He states he feels better less depressed sleep has improved feels more motivated , happy   Will have him stop atenolol , he will monitor BP at home follow up here in 2-3 weeks sooner if needed he agrees with this plan        Graves disease  Patient follows with [facility] endocrinology , continue levothyroxine at present dose       Postablative hypothyroidism         Depression, unspecified depression type  See HPI patient has been feeling better as he is tapering off atenolol ,he will be returning for follow up BP check 2-3 weeks               History of Present Illness     HPIPatient here for follow up visit to that of 2025 , interpretor #  [id number] present during office visit , he notes having depression since hyperthyroidism dx , sleep issues sleep disturbance lack of interest in things he used to enjoy   He was asked to taper  atenolol ? He has been taking atenolol q other day , he does feel his depression sx are not as prevalent Contributing to these sx and he was bradycardic   He has been taking atenolol since 2016 , he had been taken off of the med x 1 year 2017 he felt fine when he was off of the med   Review of Systems   Constitutional:  Negative for chills and fever.   HENT:  Negative for ear pain and sore throat.    Eyes:  Negative for pain and visual disturbance.   Respiratory:  Negative for cough and shortness of breath.    Cardiovascular:  Negative for chest pain and palpitations.   Gastrointestinal:  Negative for  "abdominal pain and vomiting.   Genitourinary:  Negative for dysuria and hematuria.   Musculoskeletal:  Negative for arthralgias and back pain.   Skin:  Negative for color change and rash.   Neurological:  Negative for seizures and syncope.   Psychiatric/Behavioral:  Positive for sleep disturbance.         Sadness , lack of interest    All other systems reviewed and are negative.    Past Medical History[1]  Past Surgical History[2]  Family History[3]  Social History[4]  Medications[5]  No Known Allergies  Immunization History   Administered Date(s) Administered    COVID-19 MODERNA VACC 0.5 ML IM 05/22/2021, 06/19/2021    Influenza, recombinant, quadrivalent,injectable, preservative free 02/25/2021    Tdap 02/25/2021     Objective   /74 (BP Location: Left arm, Patient Position: Sitting, Cuff Size: Large)   Pulse 59   Temp 98.3 °F (36.8 °C) (Temporal)   Ht 5' 3\" (1.6 m)   Wt 64.9 kg (143 lb)   BMI 25.33 kg/m²     Physical Exam  Vitals and nursing note reviewed.   Constitutional:       General: He is not in acute distress.     Appearance: He is well-developed.      Comments: Skin with good color turgor , well hydrated ,no distress noted     HENT:      Head: Normocephalic and atraumatic.      Right Ear: No decreased hearing noted. No middle ear effusion. There is no impacted cerumen.      Left Ear: No decreased hearing noted.  No middle ear effusion. There is no impacted cerumen.     Eyes:      Conjunctiva/sclera: Conjunctivae normal.     Neck:      Thyroid: No thyromegaly.     Cardiovascular:      Rate and Rhythm: Normal rate and regular rhythm.      Heart sounds: Normal heart sounds. No murmur heard.  Pulmonary:      Effort: Pulmonary effort is normal. No respiratory distress.      Breath sounds: Normal breath sounds.   Abdominal:      Palpations: Abdomen is soft.      Tenderness: There is no abdominal tenderness.     Musculoskeletal:         General: No swelling.      Cervical back: Neck supple. "   Lymphadenopathy:      Cervical:      Right cervical: No superficial cervical adenopathy.     Left cervical: No superficial cervical adenopathy.     Skin:     General: Skin is warm and dry.      Capillary Refill: Capillary refill takes less than 2 seconds.     Neurological:      Mental Status: He is alert.      Comments: Non focal exam    Psychiatric:         Attention and Perception: Attention normal.         Mood and Affect: Mood normal.         Speech: Speech normal.         Behavior: Behavior normal.         Thought Content: Thought content normal.                [1]   Past Medical History:  Diagnosis Date    Disease of thyroid gland     hyperthyroid    Environmental allergies     Graves disease     Hypertension    [2]   Past Surgical History:  Procedure Laterality Date    HERNIA REPAIR      ND HEMORRHOIDECTOMY INT & XTRNL 2/> COLUMN/DENIZ N/A 6/11/2021    Procedure: HEMORRHOIDECTOMY;  Surgeon: Jeff Red MD;  Location:  MAIN OR;  Service: Colorectal   [3]   Family History  Problem Relation Name Age of Onset    Parkinsonism Father     [4]   Social History  Tobacco Use    Smoking status: Never    Smokeless tobacco: Never   Vaping Use    Vaping status: Never Used   Substance and Sexual Activity    Alcohol use: No    Drug use: No   [5]   Current Outpatient Medications on File Prior to Visit   Medication Sig    acetaminophen (TYLENOL) 325 mg tablet Take 650 mg by mouth every 6 (six) hours as needed for mild pain    atenolol (TENORMIN) 25 mg tablet Take 1 tablet by mouth every other day    cetirizine (ZyrTEC) 10 mg tablet Take 1 tablet (10 mg total) by mouth daily    ibuprofen (MOTRIN) 600 mg tablet Take 600 mg by mouth every 6 (six) hours as needed    levothyroxine 50 mcg tablet Take 50 mcg by mouth daily    sodium chloride (OCEAN) 0.65 % nasal spray 2 sprays into each nostril 4 (four) times a day as needed for congestion or rhinitis (congestion o secrecion nasal) for up to 14 days    [DISCONTINUED]  acetaminophen-codeine (TYLENOL #3) 300-30 mg per tablet Take 1 tablet by mouth every 4 to 6 hours as needed for pain (Patient not taking: Reported on 3/26/2024)    [DISCONTINUED] docusate sodium (COLACE) 100 mg capsule Take 1 capsule (100 mg total) by mouth daily (Patient not taking: Reported on 3/26/2024)    [DISCONTINUED] lidocaine (XYLOCAINE) 5 % ointment Apply topically as needed for mild pain (Patient not taking: Reported on 3/26/2024)    [DISCONTINUED] phenylephrine-shark liver oil-mineral oil-petrolatum (PREPARATION H) 0.25-3-14-71.9 % rectal ointment Insert into the rectum 2 (two) times a day as needed for hemorrhoids (Patient not taking: Reported on 3/26/2024)

## 2025-05-29 NOTE — ASSESSMENT & PLAN NOTE
Patient was last here on 5/1/2025 , he was experiencing depressive sx for some time , provider felt atenolol may be contributing to these sx , he was also bradycardic he was asked to taper atenolol dose to 25 mg q other day HR 59 last was 54   He states he feels better less depressed sleep has improved feels more motivated , happy   Will have him stop atenolol , he will monitor BP at home follow up here in 2-3 weeks sooner if needed he agrees with this plan

## 2025-05-29 NOTE — ASSESSMENT & PLAN NOTE
Patient follows with [facility] endocrinology , continue levothyroxine at present dose

## 2025-05-29 NOTE — ASSESSMENT & PLAN NOTE
See HPI patient has been feeling better as he is tapering off atenolol ,he will be returning for follow up BP check 2-3 weeks

## 2025-06-26 ENCOUNTER — TELEPHONE (OUTPATIENT)
Dept: INTERNAL MEDICINE CLINIC | Facility: CLINIC | Age: 60
End: 2025-06-26

## (undated) DEVICE — NEEDLE BLUNT 18 G X 1 1/2IN

## (undated) DEVICE — INTENDED FOR TISSUE SEPARATION, AND OTHER PROCEDURES THAT REQUIRE A SHARP SURGICAL BLADE TO PUNCTURE OR CUT.: Brand: BARD-PARKER SAFETY BLADES SIZE 10, STERILE

## (undated) DEVICE — TELFA NON-ADHERENT ABSORBENT DRESSING: Brand: TELFA

## (undated) DEVICE — YANKAUER,OPEN TIP, NO VENT: Brand: ARGYLE

## (undated) DEVICE — SPONGE 4 X 4 XRAY 16 PLY STRL LF RFD

## (undated) DEVICE — STERILE POLYISOPRENE POWDER-FREE SURGICAL GLOVES: Brand: PROTEXIS

## (undated) DEVICE — SYRINGE 50ML LL

## (undated) DEVICE — STERILE POLYISOPRENE POWDER-FREE SURGICAL GLOVES WITH EMOLLIENT COATING: Brand: PROTEXIS

## (undated) DEVICE — LUBRICANT SURGILUBE TUBE 4 OZ  FLIP TOP

## (undated) DEVICE — HARMONIC FOCUS SHEARS 9CM LENGTH + ADAPTIVE TISSUE TECHNOLOGY FOR USE WITH BLUE HAND PIECE ONLY: Brand: HARMONIC FOCUS

## (undated) DEVICE — SYRINGE 10ML LL CONTROL TOP

## (undated) DEVICE — SPONGE STICK WITH PVP-I: Brand: KENDALL

## (undated) DEVICE — PENCIL ELECTROSURG E-Z CLEAN -0035H

## (undated) DEVICE — NEEDLE 25G X 1 1/2

## (undated) DEVICE — BULB SYRINGE,IRRIGATION WITH PROTECTIVE CAP: Brand: DOVER

## (undated) DEVICE — 3M™ DURAPORE™ SURGICAL TAPE 1538-3, 3 INCH X 10 YARD (7,5CM X 9,1M), 4 ROLLS/BOX: Brand: 3M™ DURAPORE™

## (undated) DEVICE — MINOR PROCEDURE DRAPE: Brand: CONVERTORS

## (undated) DEVICE — BASIC PACK: Brand: CONVERTORS

## (undated) DEVICE — LIGHT GLOVE GREEN

## (undated) DEVICE — SKIN MARKER DUAL TIP WITH RULER CAP, FLEXIBLE RULER AND LABELS: Brand: DEVON

## (undated) DEVICE — SURGICAL CLIPPER BLADE GENERAL USE

## (undated) DEVICE — SINGLE PORT MANIFOLD: Brand: NEPTUNE 2

## (undated) DEVICE — 1820 FOAM BLOCK NEEDLE COUNTER: Brand: DEVON

## (undated) DEVICE — TUBING SUCTION 5MM X 12 FT